# Patient Record
Sex: MALE | Race: WHITE | NOT HISPANIC OR LATINO
[De-identification: names, ages, dates, MRNs, and addresses within clinical notes are randomized per-mention and may not be internally consistent; named-entity substitution may affect disease eponyms.]

---

## 2018-01-03 ENCOUNTER — TRANSCRIPTION ENCOUNTER (OUTPATIENT)
Age: 63
End: 2018-01-03

## 2018-01-03 ENCOUNTER — INPATIENT (INPATIENT)
Facility: HOSPITAL | Age: 63
LOS: 0 days | Discharge: HOME | DRG: 694 | End: 2018-01-03
Attending: UROLOGY | Admitting: UROLOGY
Payer: COMMERCIAL

## 2018-01-03 VITALS
HEART RATE: 57 BPM | DIASTOLIC BLOOD PRESSURE: 79 MMHG | SYSTOLIC BLOOD PRESSURE: 152 MMHG | TEMPERATURE: 98 F | OXYGEN SATURATION: 99 %

## 2018-01-03 VITALS
OXYGEN SATURATION: 99 % | HEART RATE: 65 BPM | RESPIRATION RATE: 18 BRPM | SYSTOLIC BLOOD PRESSURE: 133 MMHG | DIASTOLIC BLOOD PRESSURE: 66 MMHG

## 2018-01-03 DIAGNOSIS — Z90.5 ACQUIRED ABSENCE OF KIDNEY: Chronic | ICD-10-CM

## 2018-01-03 DIAGNOSIS — N20.0 CALCULUS OF KIDNEY: ICD-10-CM

## 2018-01-03 LAB
ALBUMIN SERPL ELPH-MCNC: 4.5 G/DL — SIGNIFICANT CHANGE UP (ref 3.3–5)
ALP SERPL-CCNC: 59 U/L — SIGNIFICANT CHANGE UP (ref 40–120)
ALT FLD-CCNC: 19 U/L RC — SIGNIFICANT CHANGE UP (ref 10–45)
ANION GAP SERPL CALC-SCNC: 14 MMOL/L — SIGNIFICANT CHANGE UP (ref 5–17)
AST SERPL-CCNC: 20 U/L — SIGNIFICANT CHANGE UP (ref 10–40)
BASOPHILS # BLD AUTO: 0 K/UL — SIGNIFICANT CHANGE UP (ref 0–0.2)
BASOPHILS NFR BLD AUTO: 0.2 % — SIGNIFICANT CHANGE UP (ref 0–2)
BILIRUB SERPL-MCNC: 1.8 MG/DL — HIGH (ref 0.2–1.2)
BUN SERPL-MCNC: 19 MG/DL — SIGNIFICANT CHANGE UP (ref 7–23)
CALCIUM SERPL-MCNC: 10.6 MG/DL — HIGH (ref 8.4–10.5)
CHLORIDE SERPL-SCNC: 102 MMOL/L — SIGNIFICANT CHANGE UP (ref 96–108)
CO2 SERPL-SCNC: 26 MMOL/L — SIGNIFICANT CHANGE UP (ref 22–31)
CREAT SERPL-MCNC: 1.95 MG/DL — HIGH (ref 0.5–1.3)
EOSINOPHIL # BLD AUTO: 0 K/UL — SIGNIFICANT CHANGE UP (ref 0–0.5)
EOSINOPHIL NFR BLD AUTO: 0 % — SIGNIFICANT CHANGE UP (ref 0–6)
GLUCOSE SERPL-MCNC: 141 MG/DL — HIGH (ref 70–99)
HCT VFR BLD CALC: 43.2 % — SIGNIFICANT CHANGE UP (ref 39–50)
HGB BLD-MCNC: 14.9 G/DL — SIGNIFICANT CHANGE UP (ref 13–17)
LYMPHOCYTES # BLD AUTO: 0.8 K/UL — LOW (ref 1–3.3)
LYMPHOCYTES # BLD AUTO: 5.2 % — LOW (ref 13–44)
MCHC RBC-ENTMCNC: 31.1 PG — SIGNIFICANT CHANGE UP (ref 27–34)
MCHC RBC-ENTMCNC: 34.5 GM/DL — SIGNIFICANT CHANGE UP (ref 32–36)
MCV RBC AUTO: 90.2 FL — SIGNIFICANT CHANGE UP (ref 80–100)
MONOCYTES # BLD AUTO: 0.8 K/UL — SIGNIFICANT CHANGE UP (ref 0–0.9)
MONOCYTES NFR BLD AUTO: 5.1 % — SIGNIFICANT CHANGE UP (ref 2–14)
NEUTROPHILS # BLD AUTO: 14.2 K/UL — HIGH (ref 1.8–7.4)
NEUTROPHILS NFR BLD AUTO: 89.5 % — HIGH (ref 43–77)
PLATELET # BLD AUTO: 213 K/UL — SIGNIFICANT CHANGE UP (ref 150–400)
POTASSIUM SERPL-MCNC: 4.9 MMOL/L — SIGNIFICANT CHANGE UP (ref 3.5–5.3)
POTASSIUM SERPL-SCNC: 4.9 MMOL/L — SIGNIFICANT CHANGE UP (ref 3.5–5.3)
PROT SERPL-MCNC: 7.5 G/DL — SIGNIFICANT CHANGE UP (ref 6–8.3)
RBC # BLD: 4.79 M/UL — SIGNIFICANT CHANGE UP (ref 4.2–5.8)
RBC # FLD: 11.5 % — SIGNIFICANT CHANGE UP (ref 10.3–14.5)
SODIUM SERPL-SCNC: 142 MMOL/L — SIGNIFICANT CHANGE UP (ref 135–145)
WBC # BLD: 15.8 K/UL — HIGH (ref 3.8–10.5)
WBC # FLD AUTO: 15.8 K/UL — HIGH (ref 3.8–10.5)

## 2018-01-03 PROCEDURE — 93010 ELECTROCARDIOGRAM REPORT: CPT

## 2018-01-03 PROCEDURE — 74420 UROGRAPHY RTRGR +-KUB: CPT | Mod: 26

## 2018-01-03 PROCEDURE — 74176 CT ABD & PELVIS W/O CONTRAST: CPT | Mod: 26

## 2018-01-03 PROCEDURE — 85027 COMPLETE CBC AUTOMATED: CPT

## 2018-01-03 PROCEDURE — 76000 FLUOROSCOPY <1 HR PHYS/QHP: CPT

## 2018-01-03 PROCEDURE — 52332 CYSTOSCOPY AND TREATMENT: CPT | Mod: LT

## 2018-01-03 PROCEDURE — C1758: CPT

## 2018-01-03 PROCEDURE — 99285 EMERGENCY DEPT VISIT HI MDM: CPT | Mod: 25

## 2018-01-03 PROCEDURE — 74176 CT ABD & PELVIS W/O CONTRAST: CPT

## 2018-01-03 PROCEDURE — C1769: CPT

## 2018-01-03 PROCEDURE — 96374 THER/PROPH/DIAG INJ IV PUSH: CPT

## 2018-01-03 PROCEDURE — 93005 ELECTROCARDIOGRAM TRACING: CPT

## 2018-01-03 PROCEDURE — 80053 COMPREHEN METABOLIC PANEL: CPT

## 2018-01-03 PROCEDURE — C2617: CPT

## 2018-01-03 RX ORDER — CEFTRIAXONE 500 MG/1
1 INJECTION, POWDER, FOR SOLUTION INTRAMUSCULAR; INTRAVENOUS ONCE
Qty: 0 | Refills: 0 | Status: COMPLETED | OUTPATIENT
Start: 2018-01-03 | End: 2018-01-03

## 2018-01-03 RX ORDER — SODIUM CHLORIDE 9 MG/ML
1000 INJECTION, SOLUTION INTRAVENOUS
Qty: 0 | Refills: 0 | Status: DISCONTINUED | OUTPATIENT
Start: 2018-01-03 | End: 2018-01-03

## 2018-01-03 RX ORDER — ONDANSETRON 8 MG/1
4 TABLET, FILM COATED ORAL ONCE
Qty: 0 | Refills: 0 | Status: COMPLETED | OUTPATIENT
Start: 2018-01-03 | End: 2018-01-03

## 2018-01-03 RX ORDER — SODIUM CHLORIDE 9 MG/ML
1000 INJECTION INTRAMUSCULAR; INTRAVENOUS; SUBCUTANEOUS ONCE
Qty: 0 | Refills: 0 | Status: COMPLETED | OUTPATIENT
Start: 2018-01-03 | End: 2018-01-03

## 2018-01-03 RX ORDER — SODIUM CHLORIDE 9 MG/ML
1000 INJECTION INTRAMUSCULAR; INTRAVENOUS; SUBCUTANEOUS
Qty: 0 | Refills: 0 | Status: DISCONTINUED | OUTPATIENT
Start: 2018-01-03 | End: 2018-01-03

## 2018-01-03 RX ORDER — HYDROMORPHONE HYDROCHLORIDE 2 MG/ML
1 INJECTION INTRAMUSCULAR; INTRAVENOUS; SUBCUTANEOUS ONCE
Qty: 0 | Refills: 0 | Status: DISCONTINUED | OUTPATIENT
Start: 2018-01-03 | End: 2018-01-03

## 2018-01-03 RX ORDER — HYDROMORPHONE HYDROCHLORIDE 2 MG/ML
1 INJECTION INTRAMUSCULAR; INTRAVENOUS; SUBCUTANEOUS
Qty: 0 | Refills: 0 | Status: DISCONTINUED | OUTPATIENT
Start: 2018-01-03 | End: 2018-01-03

## 2018-01-03 RX ORDER — HYDROMORPHONE HYDROCHLORIDE 2 MG/ML
0.25 INJECTION INTRAMUSCULAR; INTRAVENOUS; SUBCUTANEOUS
Qty: 0 | Refills: 0 | Status: DISCONTINUED | OUTPATIENT
Start: 2018-01-03 | End: 2018-01-03

## 2018-01-03 RX ORDER — TAMSULOSIN HYDROCHLORIDE 0.4 MG/1
1 CAPSULE ORAL
Qty: 14 | Refills: 0 | OUTPATIENT
Start: 2018-01-03 | End: 2018-01-16

## 2018-01-03 RX ORDER — MORPHINE SULFATE 50 MG/1
4 CAPSULE, EXTENDED RELEASE ORAL ONCE
Qty: 0 | Refills: 0 | Status: DISCONTINUED | OUTPATIENT
Start: 2018-01-03 | End: 2018-01-03

## 2018-01-03 RX ORDER — ONDANSETRON 8 MG/1
4 TABLET, FILM COATED ORAL ONCE
Qty: 0 | Refills: 0 | Status: DISCONTINUED | OUTPATIENT
Start: 2018-01-03 | End: 2018-01-03

## 2018-01-03 RX ORDER — MORPHINE SULFATE 50 MG/1
6 CAPSULE, EXTENDED RELEASE ORAL ONCE
Qty: 0 | Refills: 0 | Status: DISCONTINUED | OUTPATIENT
Start: 2018-01-03 | End: 2018-01-03

## 2018-01-03 RX ORDER — CEPHALEXIN 500 MG
1 CAPSULE ORAL
Qty: 6 | Refills: 0 | OUTPATIENT
Start: 2018-01-03 | End: 2018-01-05

## 2018-01-03 RX ADMIN — SODIUM CHLORIDE 125 MILLILITER(S): 9 INJECTION, SOLUTION INTRAVENOUS at 13:03

## 2018-01-03 RX ADMIN — SODIUM CHLORIDE 1000 MILLILITER(S): 9 INJECTION INTRAMUSCULAR; INTRAVENOUS; SUBCUTANEOUS at 08:15

## 2018-01-03 RX ADMIN — SODIUM CHLORIDE 1000 MILLILITER(S): 9 INJECTION INTRAMUSCULAR; INTRAVENOUS; SUBCUTANEOUS at 10:28

## 2018-01-03 RX ADMIN — MORPHINE SULFATE 6 MILLIGRAM(S): 50 CAPSULE, EXTENDED RELEASE ORAL at 08:15

## 2018-01-03 RX ADMIN — CEFTRIAXONE 100 GRAM(S): 500 INJECTION, POWDER, FOR SOLUTION INTRAMUSCULAR; INTRAVENOUS at 12:57

## 2018-01-03 RX ADMIN — ONDANSETRON 4 MILLIGRAM(S): 8 TABLET, FILM COATED ORAL at 12:45

## 2018-01-03 RX ADMIN — MORPHINE SULFATE 6 MILLIGRAM(S): 50 CAPSULE, EXTENDED RELEASE ORAL at 09:31

## 2018-01-03 RX ADMIN — HYDROMORPHONE HYDROCHLORIDE 1 MILLIGRAM(S): 2 INJECTION INTRAMUSCULAR; INTRAVENOUS; SUBCUTANEOUS at 09:31

## 2018-01-03 RX ADMIN — HYDROMORPHONE HYDROCHLORIDE 1 MILLIGRAM(S): 2 INJECTION INTRAMUSCULAR; INTRAVENOUS; SUBCUTANEOUS at 08:40

## 2018-01-03 NOTE — H&P ADULT - NSHPLABSRESULTS_GEN_ALL_CORE
14.9   15.8  )-----------( 213      ( 03 Jan 2018 08:16 )             43.2   01-03    142  |  102  |  19  ----------------------------<  141<H>  4.9   |  26  |  1.95<H>    Ca    10.6<H>      03 Jan 2018 08:16    TPro  7.5  /  Alb  4.5  /  TBili  1.8<H>  /  DBili  x   /  AST  20  /  ALT  19  /  AlkPhos  59  01-03    UA pending    < from: CT Abdomen and Pelvis No Cont (01.03.18 @ 09:10) >      Severe left-sided hydronephrosis with an obstructive 1.1 cm calculus at   the proximal ureter. Additional punctate nonobstructive left renal   calculi.      < end of copied text >

## 2018-01-03 NOTE — DISCHARGE NOTE ADULT - HOSPITAL COURSE
62 year old male with history of solitary L kidney  ( R nephrectomy 20 years ago), c/o left flank pain since last night.  He also c/o nausea and vomiting, but no fevers or chills. No dysuria or hematuria, but unable to void since 4am.  He has no history of kidney stones. Currently pain controlled. Creat in ER is 1.9, baseline 1.0.      He was taken to the OR for a L stent. Postop he was stable and was sent home and will follow up on Friday for recheck of Creat 62 year old male with history of solitary L kidney  ( R nephrectomy 20 years ago), c/o left flank pain since last night.  He also c/o nausea and vomiting, but no fevers or chills. No dysuria or hematuria, but unable to void since 4am.  He has no history of kidney stones. Currently pain controlled. Creat in ER is 1.9, baseline 1.0.      He was taken to the OR for a L stent. Postop he was stable and was sent home and will follow up on Friday for recheck of Creatinine. Patient will follow up next week for evaluation and stone planning.

## 2018-01-03 NOTE — H&P ADULT - HISTORY OF PRESENT ILLNESS
62 year old male with history of solitary L kidney  ( R nephrectomy 20 years ago), c/o left flank pain since last night.  He also c/o nausea and vomiting, but no fevers or chills. No dysuria or hematuria, but unable to void since 4am.  He has no history of kidney stones. Currently pain controlled. Creat in ER is 1.9, baseline 1.0.

## 2018-01-03 NOTE — ED PROVIDER NOTE - PROGRESS NOTE DETAILS
Administered morphine for pain, fluids. R/o nephrolithiasis. Dr. Castano Note: pain improved after multiple doses of opiates.  Pt updated on ct results.  Given one kidney with elevated Cr and severe pain with large proximal stone, will consult urologist for likely stone removal.

## 2018-01-03 NOTE — ED PROVIDER NOTE - ATTENDING CONTRIBUTION TO CARE
Pt s/p resected kidney several years ago with normal renal function presents with acute flank pain preceded by vague flank pain 2 days prior, acute today, constant, severe.  L CVA td, severe distress.

## 2018-01-03 NOTE — ED PROVIDER NOTE - OBJECTIVE STATEMENT
61 yo m with history of right kidney removal (from UPJ obstruction) presents with chief complaint of left flank pain that began abruptly overnight. Describes pain as severe, nonradiating with associated NBNB vomiting x1. The patient denies taking medications for pain. Denies dysuria, hematuria, fever or chills.

## 2018-01-03 NOTE — ED ADULT NURSE NOTE - OBJECTIVE STATEMENT
Pt is a 63 yo M who came to the ED amb c/o left flank pain since 0300 today. Paion is sharp, non radiating, + chills, no fever, no hematuria, no pain/burning, no difficulty voiding.

## 2018-01-03 NOTE — DISCHARGE NOTE ADULT - PATIENT PORTAL LINK FT
“You can access the Follow[employer] Patient Portal, offered by [facility], by registering with the following website: [email or web address]”

## 2018-01-03 NOTE — DISCHARGE NOTE ADULT - PLAN OF CARE
If you are taking narcotic pain medication (such as Percocet), do NOT drive a car, operate machinery or make important decisions.  DIET: Return to your usual diet.  PAIN: You may take your pain prescription up to the amount and intervals prescribed. You may choose to space the medication to longer intervals, and to take over the counter pain medications such as Tylenol or Ibuprofen. Please do not exceed greater than 3500mg of Tylenol per day.  NOTIFY YOUR SURGEON IF: You have any fever (over 100.4 F) or chills, persistent nausea/vomiting, persistent diarrhea, or if your pain is not controlled on your discharge pain medications.  FOLLOW-UP:  1. Please arrive at clinic on Friday for blood work. Phone number for clinic is [phone]. Address for clinic is [address] [place] [address].  2. Call Camelia's office at the above number for appointment any day next week. Relief from pain and optimization of renal function

## 2018-01-03 NOTE — DISCHARGE NOTE ADULT - MEDICATION SUMMARY - MEDICATIONS TO TAKE
I will START or STAY ON the medications listed below when I get home from the hospital:    oxyCODONE-acetaminophen 5 mg-325 mg oral tablet  -- 1 tab(s) by mouth every 6 hours MDD:6  -- Caution federal law prohibits the transfer of this drug to any person other  than the person for whom it was prescribed.  May cause drowsiness.  Alcohol may intensify this effect.  Use care when operating dangerous machinery.  This prescription cannot be refilled.  This product contains acetaminophen.  Do not use  with any other product containing acetaminophen to prevent possible liver damage.  Using more of this medication than prescribed may cause serious breathing problems.    -- Indication: For As needed for pain    tamsulosin 0.4 mg oral capsule  -- 1 cap(s) by mouth once a day (at bedtime)   -- It is very important that you take or use this exactly as directed.  Do not skip doses or discontinue unless directed by your doctor.  May cause drowsiness.  Alcohol may intensify this effect.  Use care when operating dangerous machinery.  Some non-prescription drugs may aggravate your condition.  Read all labels carefully.  If a warning appears, check with your doctor before taking.  Swallow whole.  Do not crush.  Take with food or milk.    -- Indication: For Stone    Keflex 250 mg oral capsule  -- 1 cap(s) by mouth 2 times a day   -- Finish all this medication unless otherwise directed by prescriber.    -- Indication: For Antibiotics prophylaxis    omeprazole  -- Indication: For Home

## 2018-01-03 NOTE — H&P ADULT - ASSESSMENT
CECILY, renal colic due to 1cm stone in L solitary kidney  - Admit  - Hydration  - pain control  - NPO  - OR for emergent stent placement

## 2018-01-03 NOTE — ED ADULT NURSE REASSESSMENT NOTE - NS ED NURSE REASSESS COMMENT FT1
Pt declined jorge, states the urologist stated it wasn't necessary. Dr Castano advised. A/O x3, pain improved. transferred to the OR.

## 2018-01-03 NOTE — DISCHARGE NOTE ADULT - CARE PLAN
Principal Discharge DX:	Kidney stone  Goal:	Relief from pain and optimization of renal function  Instructions for follow-up, activity and diet:	If you are taking narcotic pain medication (such as Percocet), do NOT drive a car, operate machinery or make important decisions.  DIET: Return to your usual diet.  PAIN: You may take your pain prescription up to the amount and intervals prescribed. You may choose to space the medication to longer intervals, and to take over the counter pain medications such as Tylenol or Ibuprofen. Please do not exceed greater than 3500mg of Tylenol per day.  NOTIFY YOUR SURGEON IF: You have any fever (over 100.4 F) or chills, persistent nausea/vomiting, persistent diarrhea, or if your pain is not controlled on your discharge pain medications.  FOLLOW-UP:  1. Please arrive at clinic on Friday for blood work. Phone number for clinic is [phone]. Address for clinic is [address] [place] [address].  2. Call Camelia's office at the above number for appointment any day next week.

## 2018-01-04 ENCOUNTER — TRANSCRIPTION ENCOUNTER (OUTPATIENT)
Age: 63
End: 2018-01-04

## 2018-01-05 LAB
ANION GAP SERPL CALC-SCNC: 13 MMOL/L
BUN SERPL-MCNC: 19 MG/DL
CALCIUM SERPL-MCNC: 10.6 MG/DL
CHLORIDE SERPL-SCNC: 100 MMOL/L
CO2 SERPL-SCNC: 29 MMOL/L
CREAT SERPL-MCNC: 1.15 MG/DL
GLUCOSE SERPL-MCNC: 79 MG/DL
POTASSIUM SERPL-SCNC: 5.2 MMOL/L
SODIUM SERPL-SCNC: 142 MMOL/L

## 2018-01-09 ENCOUNTER — APPOINTMENT (OUTPATIENT)
Dept: UROLOGY | Facility: CLINIC | Age: 63
End: 2018-01-09
Payer: COMMERCIAL

## 2018-01-09 PROCEDURE — 99214 OFFICE O/P EST MOD 30 MIN: CPT

## 2018-01-10 LAB
CALCIUM SERPL-MCNC: 10.2 MG/DL
PARATHYROID HORMONE INTACT: 49 PG/ML

## 2018-01-11 LAB — BACTERIA UR CULT: NORMAL

## 2018-01-24 ENCOUNTER — OUTPATIENT (OUTPATIENT)
Dept: OUTPATIENT SERVICES | Facility: HOSPITAL | Age: 63
LOS: 1 days | End: 2018-01-24
Payer: COMMERCIAL

## 2018-01-24 VITALS
HEIGHT: 74 IN | SYSTOLIC BLOOD PRESSURE: 144 MMHG | OXYGEN SATURATION: 96 % | WEIGHT: 179.02 LBS | TEMPERATURE: 99 F | HEART RATE: 72 BPM | RESPIRATION RATE: 17 BRPM | DIASTOLIC BLOOD PRESSURE: 72 MMHG

## 2018-01-24 DIAGNOSIS — Z90.5 ACQUIRED ABSENCE OF KIDNEY: Chronic | ICD-10-CM

## 2018-01-24 DIAGNOSIS — N20.1 CALCULUS OF URETER: ICD-10-CM

## 2018-01-24 DIAGNOSIS — K21.9 GASTRO-ESOPHAGEAL REFLUX DISEASE WITHOUT ESOPHAGITIS: ICD-10-CM

## 2018-01-24 LAB
APPEARANCE UR: SIGNIFICANT CHANGE UP
BILIRUB UR-MCNC: NEGATIVE — SIGNIFICANT CHANGE UP
BLOOD UR QL VISUAL: HIGH
BUN SERPL-MCNC: 18 MG/DL — SIGNIFICANT CHANGE UP (ref 7–23)
CALCIUM SERPL-MCNC: 9.7 MG/DL — SIGNIFICANT CHANGE UP (ref 8.4–10.5)
CHLORIDE SERPL-SCNC: 104 MMOL/L — SIGNIFICANT CHANGE UP (ref 98–107)
CO2 SERPL-SCNC: 29 MMOL/L — SIGNIFICANT CHANGE UP (ref 22–31)
COLOR SPEC: YELLOW — SIGNIFICANT CHANGE UP
CREAT SERPL-MCNC: 1.07 MG/DL — SIGNIFICANT CHANGE UP (ref 0.5–1.3)
GLUCOSE SERPL-MCNC: 91 MG/DL — SIGNIFICANT CHANGE UP (ref 70–99)
GLUCOSE UR-MCNC: NEGATIVE — SIGNIFICANT CHANGE UP
HCT VFR BLD CALC: 44.7 % — SIGNIFICANT CHANGE UP (ref 39–50)
HGB BLD-MCNC: 14.2 G/DL — SIGNIFICANT CHANGE UP (ref 13–17)
HYALINE CASTS # UR AUTO: SIGNIFICANT CHANGE UP (ref 0–?)
KETONES UR-MCNC: NEGATIVE — SIGNIFICANT CHANGE UP
LEUKOCYTE ESTERASE UR-ACNC: HIGH
MCHC RBC-ENTMCNC: 28.3 PG — SIGNIFICANT CHANGE UP (ref 27–34)
MCHC RBC-ENTMCNC: 31.8 % — LOW (ref 32–36)
MCV RBC AUTO: 89.2 FL — SIGNIFICANT CHANGE UP (ref 80–100)
MUCOUS THREADS # UR AUTO: SIGNIFICANT CHANGE UP
NITRITE UR-MCNC: NEGATIVE — SIGNIFICANT CHANGE UP
NRBC # FLD: 0 — SIGNIFICANT CHANGE UP
PH UR: 6 — SIGNIFICANT CHANGE UP (ref 4.6–8)
PLATELET # BLD AUTO: 238 K/UL — SIGNIFICANT CHANGE UP (ref 150–400)
PMV BLD: 11 FL — SIGNIFICANT CHANGE UP (ref 7–13)
POTASSIUM SERPL-MCNC: 4.7 MMOL/L — SIGNIFICANT CHANGE UP (ref 3.5–5.3)
POTASSIUM SERPL-SCNC: 4.7 MMOL/L — SIGNIFICANT CHANGE UP (ref 3.5–5.3)
PROT UR-MCNC: 100 MG/DL — SIGNIFICANT CHANGE UP
RBC # BLD: 5.01 M/UL — SIGNIFICANT CHANGE UP (ref 4.2–5.8)
RBC # FLD: 13 % — SIGNIFICANT CHANGE UP (ref 10.3–14.5)
RBC CASTS # UR COMP ASSIST: >50 — HIGH (ref 0–?)
SODIUM SERPL-SCNC: 144 MMOL/L — SIGNIFICANT CHANGE UP (ref 135–145)
SP GR SPEC: 1.02 — SIGNIFICANT CHANGE UP (ref 1–1.04)
UROBILINOGEN FLD QL: NORMAL MG/DL — SIGNIFICANT CHANGE UP
WBC # BLD: 7.59 K/UL — SIGNIFICANT CHANGE UP (ref 3.8–10.5)
WBC # FLD AUTO: 7.59 K/UL — SIGNIFICANT CHANGE UP (ref 3.8–10.5)
WBC UR QL: HIGH (ref 0–?)

## 2018-01-24 PROCEDURE — 93010 ELECTROCARDIOGRAM REPORT: CPT

## 2018-01-24 RX ORDER — SODIUM CHLORIDE 9 MG/ML
1000 INJECTION, SOLUTION INTRAVENOUS
Qty: 0 | Refills: 0 | Status: DISCONTINUED | OUTPATIENT
Start: 2018-01-29 | End: 2018-01-29

## 2018-01-24 NOTE — H&P PST ADULT - PROBLEM SELECTOR PLAN 1
Left Ureteroscopy, Laser Lithotripsy, Possible Balloon Dilation/Laser or Distal Stricture on 01/29/18.  Preop instructions given, pt verbalized understanding.  Chlorhexidine wash with instructions given  Pt to take own PPI for GI prophylaxis  Pt has appt for medical clearance. Clearance requested Left Ureteroscopy, Laser Lithotripsy, Possible Balloon Dilation/Laser or Distal Stricture on 01/29/18.  Preop instructions given, pt verbalized understanding.  Chlorhexidine wash with instructions given  Pt to take own PPI for GI prophylaxis  Pt has appt for medical clearance. Requested copy

## 2018-01-24 NOTE — H&P PST ADULT - FAMILY HISTORY
Mother  Still living? Unknown  Family history of carcinoid tumor, Age at diagnosis: Age Unknown     Grandparent  Still living? Unknown  Family history of pancreatic cancer, Age at diagnosis: Age Unknown

## 2018-01-24 NOTE — H&P PST ADULT - NEGATIVE GASTROINTESTINAL SYMPTOMS
no constipation/no vomiting/no diarrhea/no nausea no abdominal pain/no diarrhea/no nausea/no vomiting/no constipation

## 2018-01-24 NOTE — H&P PST ADULT - NSANTHOSAYNRD_GEN_A_CORE
No. ANDREW screening performed.  STOP BANG Legend: 0-2 = LOW Risk; 3-4 = INTERMEDIATE Risk; 5-8 = HIGH Risk

## 2018-01-24 NOTE — H&P PST ADULT - PMH
Calculus of ureter    GERD (gastroesophageal reflux disease)    UPJ obstruction, congenital Barretts esophagus  reports s/p radiofrequency ablation in 2012  Calculus of ureter    GERD (gastroesophageal reflux disease)    UPJ obstruction, congenital

## 2018-01-24 NOTE — H&P PST ADULT - PSH
History of nephrectomy  right, due to congenital defect History of nephrectomy  right, due to congenital defect ~1998

## 2018-01-24 NOTE — H&P PST ADULT - GASTROINTESTINAL COMMENTS
hx of barretts esophagus, last EGD 2015 hx of barretts esophagus, treated, last EGD 2015 normal as per patient

## 2018-01-24 NOTE — H&P PST ADULT - HISTORY OF PRESENT ILLNESS
62 y.o. male with hx of congenital UPJ, s/p right nephrectomy in 1998, reports c/o left sided flank pain since 01/02/18, denies hematuria, dysuria, s/p ER visit, s/p CT scan, diagnosed with left renal stone, s/p stent placement. Pt presents to PST for evaluation for Left Ureteroscopy, Laser Lithotripsy, Possible Balloon Dilation/Laser or Distal Stricture on 01/29/18. 62 y.o. male with hx of congenital UPJ, s/p right nephrectomy in 1998, reports c/o sudden onset of severe left sided flank pain since 01/02/18,  s/p ER visit, s/p CT scan, diagnosed with left renal stone, reports ureteral stent placement was done, denies hematuria, dysuria. Pt presents to [facility] for evaluation for Left Ureteroscopy, Laser Lithotripsy, Possible Balloon Dilation/Laser or Distal Stricture on 01/29/18. 62 y.o. male with hx of congenital UPJ obstruction, s/p right nephrectomy in 1998, pt reports sudden onset of severe left sided flank pain since 01/02/18,  s/p ER visit, s/p CT scan, diagnosed with left renal stone, reports ureteral stent placement was done, denies hematuria, dysuria. Pt presents to PST for evaluation for Left Ureteroscopy, Laser Lithotripsy, Possible Balloon Dilation/Laser or Distal Stricture on 01/29/18.

## 2018-01-24 NOTE — H&P PST ADULT - MUSCULOSKELETAL
details… detailed exam no joint swelling/no joint erythema/normal strength/no calf tenderness/no joint warmth/ROM intact

## 2018-01-24 NOTE — H&P PST ADULT - NEGATIVE GENERAL GENITOURINARY SYMPTOMS
no hematuria/no bladder infections/no dysuria no hematuria/no dysuria/no urine discoloration/no bladder infections

## 2018-01-26 LAB
BACTERIA UR CULT: SIGNIFICANT CHANGE UP
SPECIMEN SOURCE: SIGNIFICANT CHANGE UP

## 2018-01-26 NOTE — ASU PATIENT PROFILE, ADULT - PMH
Barretts esophagus  reports s/p radiofrequency ablation in 2012  Calculus of ureter    GERD (gastroesophageal reflux disease)    UPJ obstruction, congenital

## 2018-01-29 ENCOUNTER — OUTPATIENT (OUTPATIENT)
Dept: INPATIENT UNIT | Facility: HOSPITAL | Age: 63
LOS: 1 days | Discharge: HOME | End: 2018-01-29

## 2018-01-29 ENCOUNTER — APPOINTMENT (OUTPATIENT)
Dept: UROLOGY | Facility: HOSPITAL | Age: 63
End: 2018-01-29

## 2018-01-29 VITALS
RESPIRATION RATE: 15 BRPM | HEART RATE: 69 BPM | OXYGEN SATURATION: 94 % | DIASTOLIC BLOOD PRESSURE: 54 MMHG | SYSTOLIC BLOOD PRESSURE: 103 MMHG

## 2018-01-29 VITALS
DIASTOLIC BLOOD PRESSURE: 78 MMHG | WEIGHT: 175.05 LBS | HEIGHT: 74 IN | OXYGEN SATURATION: 96 % | RESPIRATION RATE: 16 BRPM | TEMPERATURE: 98 F | SYSTOLIC BLOOD PRESSURE: 129 MMHG | HEART RATE: 66 BPM

## 2018-01-29 DIAGNOSIS — Z90.5 ACQUIRED ABSENCE OF KIDNEY: Chronic | ICD-10-CM

## 2018-01-29 DIAGNOSIS — N20.1 CALCULUS OF URETER: ICD-10-CM

## 2018-01-29 RX ORDER — ONDANSETRON 8 MG/1
4 TABLET, FILM COATED ORAL ONCE
Qty: 0 | Refills: 0 | Status: DISCONTINUED | OUTPATIENT
Start: 2018-01-29 | End: 2018-01-29

## 2018-01-29 RX ORDER — HYDROMORPHONE HYDROCHLORIDE 2 MG/ML
0.5 INJECTION INTRAMUSCULAR; INTRAVENOUS; SUBCUTANEOUS
Qty: 0 | Refills: 0 | Status: DISCONTINUED | OUTPATIENT
Start: 2018-01-29 | End: 2018-01-29

## 2018-01-29 RX ORDER — AZTREONAM 2 G
1 VIAL (EA) INJECTION
Qty: 6 | Refills: 0 | OUTPATIENT
Start: 2018-01-29 | End: 2018-01-31

## 2018-01-29 RX ORDER — SODIUM CHLORIDE 9 MG/ML
1000 INJECTION, SOLUTION INTRAVENOUS
Qty: 0 | Refills: 0 | Status: DISCONTINUED | OUTPATIENT
Start: 2018-01-29 | End: 2018-01-29

## 2018-01-29 RX ORDER — TAMSULOSIN HYDROCHLORIDE 0.4 MG/1
1 CAPSULE ORAL
Qty: 30 | Refills: 0 | OUTPATIENT
Start: 2018-01-29 | End: 2018-02-27

## 2018-01-29 RX ADMIN — SODIUM CHLORIDE 75 MILLILITER(S): 9 INJECTION, SOLUTION INTRAVENOUS at 17:33

## 2018-01-29 RX ADMIN — SODIUM CHLORIDE 30 MILLILITER(S): 9 INJECTION, SOLUTION INTRAVENOUS at 17:17

## 2018-01-29 NOTE — ASU DISCHARGE PLAN (ADULT/PEDIATRIC). - INSTRUCTIONS
Please call your surgeon's office to schedule follow up in 7-10 days for office cystoscopy and ureteral stent removal. No change in diet. Drink plenty of fluids.

## 2018-01-29 NOTE — BRIEF OPERATIVE NOTE - PROCEDURE
<<-----Click on this checkbox to enter Procedure Ureteroscopy of left ureter with lithotripsy  01/29/2018    Active  [id number]

## 2018-01-29 NOTE — ASU DISCHARGE PLAN (ADULT/PEDIATRIC). - MEDICATION SUMMARY - MEDICATIONS TO TAKE
I will START or STAY ON the medications listed below when I get home from the hospital:    acetaminophen 325 mg oral tablet, disintegrating  -- 2 tab(s) by mouth every 4 hours, As Needed  -- Indication: For moderate to severe pain, as needed, over the counter    tamsulosin 0.4 mg oral capsule  -- 1 cap(s) by mouth once a day MDD:1 tab  -- It is very important that you take or use this exactly as directed.  Do not skip doses or discontinue unless directed by your doctor.  May cause drowsiness.  Alcohol may intensify this effect.  Use care when operating dangerous machinery.  Some non-prescription drugs may aggravate your condition.  Read all labels carefully.  If a warning appears, check with your doctor before taking.  Swallow whole.  Do not crush.  Take with food or milk.    -- Indication: For for stent discomfort, please take until stent removed    omeprazole 40 mg oral delayed release capsule  -- 1 cap(s) by mouth once a day  -- Indication: For home med    Bactrim  mg-160 mg oral tablet  -- 1 tab(s) by mouth 2 times a day MDD:2 tabs  -- Avoid prolonged or excessive exposure to direct and/or artificial sunlight while taking this medication.  Finish all this medication unless otherwise directed by prescriber.  Medication should be taken with plenty of water.    -- Indication: For antibiotic, please complete entire 3 day course

## 2018-01-30 ENCOUNTER — TRANSCRIPTION ENCOUNTER (OUTPATIENT)
Age: 63
End: 2018-01-30

## 2018-02-02 ENCOUNTER — RESULT CHARGE (OUTPATIENT)
Age: 63
End: 2018-02-02

## 2018-02-02 ENCOUNTER — APPOINTMENT (OUTPATIENT)
Dept: UROLOGY | Facility: CLINIC | Age: 63
End: 2018-02-02
Payer: COMMERCIAL

## 2018-02-02 VITALS
DIASTOLIC BLOOD PRESSURE: 75 MMHG | SYSTOLIC BLOOD PRESSURE: 147 MMHG | TEMPERATURE: 97.3 F | RESPIRATION RATE: 16 BRPM | WEIGHT: 175 LBS | BODY MASS INDEX: 21.76 KG/M2 | HEART RATE: 82 BPM | HEIGHT: 75 IN | OXYGEN SATURATION: 97 %

## 2018-02-02 VITALS — TEMPERATURE: 98.4 F

## 2018-02-02 LAB
BILIRUB UR QL STRIP: NORMAL
CLARITY UR: NORMAL
COLLECTION METHOD: NORMAL
GLUCOSE UR-MCNC: NORMAL
HCG UR QL: 0.2 EU/DL
HGB UR QL STRIP.AUTO: NORMAL
KETONES UR-MCNC: NORMAL
LEUKOCYTE ESTERASE UR QL STRIP: NORMAL
NITRITE UR QL STRIP: NORMAL
PH UR STRIP: 6
PROT UR STRIP-MCNC: NORMAL
SP GR UR STRIP: 1.01
STONE ANALYSIS-IMP: SIGNIFICANT CHANGE UP

## 2018-02-02 PROCEDURE — 99214 OFFICE O/P EST MOD 30 MIN: CPT

## 2018-02-02 PROCEDURE — 51798 US URINE CAPACITY MEASURE: CPT

## 2018-02-02 PROCEDURE — 81003 URINALYSIS AUTO W/O SCOPE: CPT | Mod: QW

## 2018-02-03 ENCOUNTER — OUTPATIENT (OUTPATIENT)
Dept: OUTPATIENT SERVICES | Facility: HOSPITAL | Age: 63
LOS: 1 days | End: 2018-02-03
Payer: COMMERCIAL

## 2018-02-03 ENCOUNTER — APPOINTMENT (OUTPATIENT)
Dept: ULTRASOUND IMAGING | Facility: CLINIC | Age: 63
End: 2018-02-03
Payer: COMMERCIAL

## 2018-02-03 ENCOUNTER — APPOINTMENT (OUTPATIENT)
Dept: RADIOLOGY | Facility: CLINIC | Age: 63
End: 2018-02-03
Payer: COMMERCIAL

## 2018-02-03 DIAGNOSIS — Z90.5 ACQUIRED ABSENCE OF KIDNEY: Chronic | ICD-10-CM

## 2018-02-03 DIAGNOSIS — Z00.8 ENCOUNTER FOR OTHER GENERAL EXAMINATION: ICD-10-CM

## 2018-02-03 LAB
ANION GAP SERPL CALC-SCNC: 17 MMOL/L
APPEARANCE: CLEAR
BACTERIA: NEGATIVE
BASOPHILS # BLD AUTO: 0.04 K/UL
BASOPHILS NFR BLD AUTO: 0.5 %
BILIRUBIN URINE: NEGATIVE
BLOOD URINE: ABNORMAL
BUN SERPL-MCNC: 21 MG/DL
CALCIUM SERPL-MCNC: 10.4 MG/DL
CHLORIDE SERPL-SCNC: 99 MMOL/L
CO2 SERPL-SCNC: 22 MMOL/L
COLOR: YELLOW
CREAT SERPL-MCNC: 1.44 MG/DL
EOSINOPHIL # BLD AUTO: 0.28 K/UL
EOSINOPHIL NFR BLD AUTO: 3.5 %
GLUCOSE QUALITATIVE U: NEGATIVE MG/DL
GLUCOSE SERPL-MCNC: 99 MG/DL
HCT VFR BLD CALC: 45.5 %
HGB BLD-MCNC: 15.4 G/DL
HYALINE CASTS: 1 /LPF
IMM GRANULOCYTES NFR BLD AUTO: 0.1 %
KETONES URINE: NEGATIVE
LEUKOCYTE ESTERASE URINE: ABNORMAL
LYMPHOCYTES # BLD AUTO: 2.63 K/UL
LYMPHOCYTES NFR BLD AUTO: 33.2 %
MAN DIFF?: NORMAL
MCHC RBC-ENTMCNC: 29.3 PG
MCHC RBC-ENTMCNC: 33.8 GM/DL
MCV RBC AUTO: 86.7 FL
MICROSCOPIC-UA: NORMAL
MONOCYTES # BLD AUTO: 0.77 K/UL
MONOCYTES NFR BLD AUTO: 9.7 %
NEUTROPHILS # BLD AUTO: 4.18 K/UL
NEUTROPHILS NFR BLD AUTO: 53 %
NITRITE URINE: NEGATIVE
PH URINE: 5.5
PLATELET # BLD AUTO: 224 K/UL
POTASSIUM SERPL-SCNC: 5.1 MMOL/L
PROTEIN URINE: NEGATIVE MG/DL
RBC # BLD: 5.25 M/UL
RBC # FLD: 13.3 %
RED BLOOD CELLS URINE: 32 /HPF
SODIUM SERPL-SCNC: 138 MMOL/L
SPECIFIC GRAVITY URINE: 1.01
SQUAMOUS EPITHELIAL CELLS: 1 /HPF
UROBILINOGEN URINE: NEGATIVE MG/DL
WBC # FLD AUTO: 7.91 K/UL
WHITE BLOOD CELLS URINE: 4 /HPF

## 2018-02-03 PROCEDURE — 76775 US EXAM ABDO BACK WALL LIM: CPT

## 2018-02-03 PROCEDURE — 74018 RADEX ABDOMEN 1 VIEW: CPT

## 2018-02-03 PROCEDURE — 76775 US EXAM ABDO BACK WALL LIM: CPT | Mod: 26

## 2018-02-03 PROCEDURE — 74018 RADEX ABDOMEN 1 VIEW: CPT | Mod: 26

## 2018-02-05 ENCOUNTER — FORM ENCOUNTER (OUTPATIENT)
Age: 63
End: 2018-02-05

## 2018-02-05 LAB — BACTERIA UR CULT: NORMAL

## 2018-02-06 ENCOUNTER — APPOINTMENT (OUTPATIENT)
Dept: UROLOGY | Facility: CLINIC | Age: 63
End: 2018-02-06

## 2018-02-06 ENCOUNTER — APPOINTMENT (OUTPATIENT)
Dept: UROLOGY | Facility: CLINIC | Age: 63
End: 2018-02-06
Payer: COMMERCIAL

## 2018-02-06 ENCOUNTER — OUTPATIENT (OUTPATIENT)
Dept: OUTPATIENT SERVICES | Facility: HOSPITAL | Age: 63
LOS: 1 days | End: 2018-02-06
Payer: COMMERCIAL

## 2018-02-06 ENCOUNTER — APPOINTMENT (OUTPATIENT)
Dept: CT IMAGING | Facility: IMAGING CENTER | Age: 63
End: 2018-02-06

## 2018-02-06 DIAGNOSIS — N20.0 CALCULUS OF KIDNEY: ICD-10-CM

## 2018-02-06 DIAGNOSIS — Z90.5 ACQUIRED ABSENCE OF KIDNEY: Chronic | ICD-10-CM

## 2018-02-06 LAB
25(OH)D3 SERPL-MCNC: 39.5 NG/ML
TSH SERPL-ACNC: 3.2 UIU/ML

## 2018-02-06 PROCEDURE — 99213 OFFICE O/P EST LOW 20 MIN: CPT

## 2018-02-06 PROCEDURE — 74176 CT ABD & PELVIS W/O CONTRAST: CPT | Mod: 26

## 2018-02-06 PROCEDURE — 74176 CT ABD & PELVIS W/O CONTRAST: CPT

## 2018-02-07 ENCOUNTER — RECORD ABSTRACTING (OUTPATIENT)
Age: 63
End: 2018-02-07

## 2018-02-07 LAB
ANION GAP SERPL CALC-SCNC: 12 MMOL/L
BUN SERPL-MCNC: 23 MG/DL
CALCIUM SERPL-MCNC: 10.5 MG/DL
CHLORIDE SERPL-SCNC: 100 MMOL/L
CO2 SERPL-SCNC: 28 MMOL/L
CREAT SERPL-MCNC: 1.2 MG/DL
GLUCOSE SERPL-MCNC: 78 MG/DL
POTASSIUM SERPL-SCNC: 5.2 MMOL/L
SODIUM SERPL-SCNC: 140 MMOL/L

## 2018-03-08 ENCOUNTER — OUTPATIENT (OUTPATIENT)
Dept: OUTPATIENT SERVICES | Facility: HOSPITAL | Age: 63
LOS: 1 days | End: 2018-03-08

## 2018-03-08 VITALS
RESPIRATION RATE: 20 BRPM | WEIGHT: 175.05 LBS | TEMPERATURE: 99 F | DIASTOLIC BLOOD PRESSURE: 80 MMHG | SYSTOLIC BLOOD PRESSURE: 120 MMHG | HEIGHT: 74 IN | OXYGEN SATURATION: 97 % | HEART RATE: 73 BPM

## 2018-03-08 DIAGNOSIS — N20.0 CALCULUS OF KIDNEY: ICD-10-CM

## 2018-03-08 DIAGNOSIS — Z90.5 ACQUIRED ABSENCE OF KIDNEY: Chronic | ICD-10-CM

## 2018-03-08 DIAGNOSIS — Z98.890 OTHER SPECIFIED POSTPROCEDURAL STATES: Chronic | ICD-10-CM

## 2018-03-08 DIAGNOSIS — K21.9 GASTRO-ESOPHAGEAL REFLUX DISEASE WITHOUT ESOPHAGITIS: ICD-10-CM

## 2018-03-08 LAB
APPEARANCE UR: CLEAR — SIGNIFICANT CHANGE UP
BILIRUB UR-MCNC: NEGATIVE — SIGNIFICANT CHANGE UP
BLOOD UR QL VISUAL: HIGH
BUN SERPL-MCNC: 23 MG/DL — SIGNIFICANT CHANGE UP (ref 7–23)
CALCIUM SERPL-MCNC: 9.6 MG/DL — SIGNIFICANT CHANGE UP (ref 8.4–10.5)
CHLORIDE SERPL-SCNC: 102 MMOL/L — SIGNIFICANT CHANGE UP (ref 98–107)
CO2 SERPL-SCNC: 27 MMOL/L — SIGNIFICANT CHANGE UP (ref 22–31)
COLOR SPEC: YELLOW — SIGNIFICANT CHANGE UP
CREAT SERPL-MCNC: 1.13 MG/DL — SIGNIFICANT CHANGE UP (ref 0.5–1.3)
GLUCOSE SERPL-MCNC: 86 MG/DL — SIGNIFICANT CHANGE UP (ref 70–99)
GLUCOSE UR-MCNC: NEGATIVE — SIGNIFICANT CHANGE UP
HCT VFR BLD CALC: 44.3 % — SIGNIFICANT CHANGE UP (ref 39–50)
HGB BLD-MCNC: 14.8 G/DL — SIGNIFICANT CHANGE UP (ref 13–17)
KETONES UR-MCNC: NEGATIVE — SIGNIFICANT CHANGE UP
LEUKOCYTE ESTERASE UR-ACNC: HIGH
MCHC RBC-ENTMCNC: 30.1 PG — SIGNIFICANT CHANGE UP (ref 27–34)
MCHC RBC-ENTMCNC: 33.4 % — SIGNIFICANT CHANGE UP (ref 32–36)
MCV RBC AUTO: 90.2 FL — SIGNIFICANT CHANGE UP (ref 80–100)
MUCOUS THREADS # UR AUTO: SIGNIFICANT CHANGE UP
NITRITE UR-MCNC: NEGATIVE — SIGNIFICANT CHANGE UP
NON-SQ EPI CELLS # UR AUTO: <1 — SIGNIFICANT CHANGE UP
NRBC # FLD: 0 — SIGNIFICANT CHANGE UP
PH UR: 6 — SIGNIFICANT CHANGE UP (ref 4.6–8)
PLATELET # BLD AUTO: 230 K/UL — SIGNIFICANT CHANGE UP (ref 150–400)
PMV BLD: 10.8 FL — SIGNIFICANT CHANGE UP (ref 7–13)
POTASSIUM SERPL-MCNC: 4.5 MMOL/L — SIGNIFICANT CHANGE UP (ref 3.5–5.3)
POTASSIUM SERPL-SCNC: 4.5 MMOL/L — SIGNIFICANT CHANGE UP (ref 3.5–5.3)
PROT UR-MCNC: 100 MG/DL — SIGNIFICANT CHANGE UP
RBC # BLD: 4.91 M/UL — SIGNIFICANT CHANGE UP (ref 4.2–5.8)
RBC # FLD: 12.8 % — SIGNIFICANT CHANGE UP (ref 10.3–14.5)
RBC CASTS # UR COMP ASSIST: >50 — HIGH (ref 0–?)
SODIUM SERPL-SCNC: 141 MMOL/L — SIGNIFICANT CHANGE UP (ref 135–145)
SP GR SPEC: 1.02 — SIGNIFICANT CHANGE UP (ref 1–1.04)
UROBILINOGEN FLD QL: NORMAL MG/DL — SIGNIFICANT CHANGE UP
WBC # BLD: 6.43 K/UL — SIGNIFICANT CHANGE UP (ref 3.8–10.5)
WBC # FLD AUTO: 6.43 K/UL — SIGNIFICANT CHANGE UP (ref 3.8–10.5)
WBC UR QL: SIGNIFICANT CHANGE UP (ref 0–?)

## 2018-03-08 RX ORDER — SODIUM CHLORIDE 9 MG/ML
1000 INJECTION, SOLUTION INTRAVENOUS
Qty: 0 | Refills: 0 | Status: DISCONTINUED | OUTPATIENT
Start: 2018-03-21 | End: 2018-03-21

## 2018-03-08 NOTE — H&P PST ADULT - NEGATIVE NEUROLOGICAL SYMPTOMS
no focal seizures/no transient paralysis/no paresthesias/no syncope/no weakness/no generalized seizures

## 2018-03-08 NOTE — H&P PST ADULT - HISTORY OF PRESENT ILLNESS
Pt is a 62 y.o. male ;Pt with h/o Congenital   UPJ obstruction; pt s/p Right Nephrectomy 1998. Pt with h/o Left Renal Stone ;pt s/p Cystoscopy I Insertion of Left Ureteral Stent 1/02/18 .  Pt s/p Left Ureteroscopy , Laser Lithotripsy , Balloon Dilatation Pt is a 62 y.o. male ;Pt with h/o Congenital   UPJ obstruction; pt s/p Right Nephrectomy 1998. Pt with h/o Left Renal Stone ;pt s/p Cystoscopy I Insertion of Left Ureteral Stent 1/02/18 .  Pt s/p Left Ureteroscopy , Laser Lithotripsy , Balloon Dilatation 1/29/18. Pt f/u with surgeon ; pt now presents for Left Ureteroscopy. Second Look Laser Lithotripsy Pt is a 62 y.o. male ;Pt with h/o Congenital   UPJ obstruction; pt s/p Right Nephrectomy 1998. Pt with h/o Left Renal Stone ;pt s/p Cystoscopy ,  Insertion of Left Ureteral Stent 1/02/18 .  Pt s/p Left Ureteroscopy , Laser Lithotripsy , Balloon Dilatation 1/29/18. Pt f/u with surgeon ; pt now presents for Left Ureteroscopy. Second Look Laser Lithotripsy

## 2018-03-08 NOTE — H&P PST ADULT - PSH
H/O cystoscopy  Left Ureteral Stent Insertion 1/03/18  History of nephrectomy  right, due to congenital defect ~1998  S/P cystoscopy  Left Ureteroscopy , laser Lithotripsy,

## 2018-03-08 NOTE — H&P PST ADULT - PROBLEM SELECTOR PLAN 1
Left Ureteroscopy, Second Look with laser Lithotripsy    Pre op instructions reviewed with pt ; pt appeared to have a good understanding of pre op instructions

## 2018-03-08 NOTE — H&P PST ADULT - PMH
Barretts esophagus  reports s/p radiofrequency ablation in 2012 ; pt last endoscopy 2016  Calculus of ureter    GERD (gastroesophageal reflux disease)    Knee pain, right  Meniscus Tear right knee  UPJ obstruction, congenital  s/p Right Nephrectomy 1998

## 2018-03-08 NOTE — H&P PST ADULT - FAMILY HISTORY
Family history of carcinoid tumor     Grandparent  Still living? Unknown  Family history of pancreatic cancer, Age at diagnosis: Age Unknown

## 2018-03-08 NOTE — H&P PST ADULT - REASON FOR ADMISSION
"He is taking another look at my left kidney" "There are still pieces of a stone left in my left kidney" " "There are still pieces of a stone remain  in my left kidney" " "There are still pieces of a stone   in my left kidney" "

## 2018-03-10 LAB
BACTERIA UR CULT: SIGNIFICANT CHANGE UP
SPECIMEN SOURCE: SIGNIFICANT CHANGE UP

## 2018-03-21 ENCOUNTER — APPOINTMENT (OUTPATIENT)
Dept: UROLOGY | Facility: HOSPITAL | Age: 63
End: 2018-03-21

## 2018-03-21 ENCOUNTER — TRANSCRIPTION ENCOUNTER (OUTPATIENT)
Age: 63
End: 2018-03-21

## 2018-03-21 ENCOUNTER — OUTPATIENT (OUTPATIENT)
Dept: OUTPATIENT SERVICES | Facility: HOSPITAL | Age: 63
LOS: 1 days | Discharge: HOME | End: 2018-03-21
Payer: COMMERCIAL

## 2018-03-21 VITALS
HEART RATE: 57 BPM | DIASTOLIC BLOOD PRESSURE: 75 MMHG | OXYGEN SATURATION: 97 % | RESPIRATION RATE: 17 BRPM | SYSTOLIC BLOOD PRESSURE: 135 MMHG

## 2018-03-21 VITALS
DIASTOLIC BLOOD PRESSURE: 77 MMHG | WEIGHT: 175.05 LBS | SYSTOLIC BLOOD PRESSURE: 128 MMHG | OXYGEN SATURATION: 97 % | HEART RATE: 61 BPM | HEIGHT: 74 IN | RESPIRATION RATE: 16 BRPM | TEMPERATURE: 98 F

## 2018-03-21 DIAGNOSIS — N20.0 CALCULUS OF KIDNEY: ICD-10-CM

## 2018-03-21 DIAGNOSIS — Z98.890 OTHER SPECIFIED POSTPROCEDURAL STATES: Chronic | ICD-10-CM

## 2018-03-21 DIAGNOSIS — Z90.5 ACQUIRED ABSENCE OF KIDNEY: Chronic | ICD-10-CM

## 2018-03-21 PROCEDURE — 52332 CYSTOSCOPY AND TREATMENT: CPT | Mod: LT

## 2018-03-21 PROCEDURE — 52352 CYSTOURETERO W/STONE REMOVE: CPT | Mod: LT

## 2018-03-21 PROCEDURE — 74420 UROGRAPHY RTRGR +-KUB: CPT | Mod: 26

## 2018-03-21 RX ORDER — FENTANYL CITRATE 50 UG/ML
25 INJECTION INTRAVENOUS
Qty: 0 | Refills: 0 | Status: DISCONTINUED | OUTPATIENT
Start: 2018-03-21 | End: 2018-03-21

## 2018-03-21 RX ORDER — FENTANYL CITRATE 50 UG/ML
50 INJECTION INTRAVENOUS
Qty: 0 | Refills: 0 | Status: DISCONTINUED | OUTPATIENT
Start: 2018-03-21 | End: 2018-03-21

## 2018-03-21 RX ORDER — CEPHALEXIN 500 MG
1 CAPSULE ORAL
Qty: 9 | Refills: 0 | OUTPATIENT
Start: 2018-03-21 | End: 2018-03-23

## 2018-03-21 RX ORDER — SODIUM CHLORIDE 9 MG/ML
1000 INJECTION, SOLUTION INTRAVENOUS
Qty: 0 | Refills: 0 | Status: DISCONTINUED | OUTPATIENT
Start: 2018-03-21 | End: 2018-04-05

## 2018-03-21 RX ORDER — ONDANSETRON 8 MG/1
4 TABLET, FILM COATED ORAL ONCE
Qty: 0 | Refills: 0 | Status: DISCONTINUED | OUTPATIENT
Start: 2018-03-21 | End: 2018-03-21

## 2018-03-21 NOTE — BRIEF OPERATIVE NOTE - PROCEDURE
<<-----Click on this checkbox to enter Procedure Cystoscopy  03/21/2018  with left ureteroscopy, retrograde pyelogram, basket stone extraction, ureteral stent exchange  Active  [id number]

## 2018-03-21 NOTE — ASU DISCHARGE PLAN (ADULT/PEDIATRIC). - MEDICATION SUMMARY - MEDICATIONS TO TAKE
I will START or STAY ON the medications listed below when I get home from the hospital:    cephalexin 500 mg oral capsule  -- 1 cap(s) by mouth every 8 hours x 3 days   -- Finish all this medication unless otherwise directed by prescriber.    -- Indication: For antibiotics    omeprazole 40 mg oral delayed release capsule  -- 1 cap(s) by mouth once a day AM  -- Indication: For GERD

## 2018-03-27 ENCOUNTER — APPOINTMENT (OUTPATIENT)
Dept: UROLOGY | Facility: CLINIC | Age: 63
End: 2018-03-27
Payer: COMMERCIAL

## 2018-03-27 LAB — STONE ANALYSIS-IMP: SIGNIFICANT CHANGE UP

## 2018-03-27 PROCEDURE — 99213 OFFICE O/P EST LOW 20 MIN: CPT

## 2018-06-19 ENCOUNTER — APPOINTMENT (OUTPATIENT)
Dept: UROLOGY | Facility: CLINIC | Age: 63
End: 2018-06-19

## 2018-06-19 ENCOUNTER — APPOINTMENT (OUTPATIENT)
Dept: ULTRASOUND IMAGING | Facility: IMAGING CENTER | Age: 63
End: 2018-06-19

## 2018-08-03 ENCOUNTER — APPOINTMENT (OUTPATIENT)
Dept: INTERNAL MEDICINE | Facility: CLINIC | Age: 63
End: 2018-08-03
Payer: COMMERCIAL

## 2018-08-03 VITALS
HEART RATE: 72 BPM | DIASTOLIC BLOOD PRESSURE: 71 MMHG | HEIGHT: 75 IN | OXYGEN SATURATION: 98 % | WEIGHT: 172 LBS | BODY MASS INDEX: 21.39 KG/M2 | SYSTOLIC BLOOD PRESSURE: 140 MMHG | RESPIRATION RATE: 15 BRPM

## 2018-08-03 DIAGNOSIS — N20.0 CALCULUS OF KIDNEY: ICD-10-CM

## 2018-08-03 DIAGNOSIS — R55 SYNCOPE AND COLLAPSE: ICD-10-CM

## 2018-08-03 DIAGNOSIS — N20.1 CALCULUS OF URETER: ICD-10-CM

## 2018-08-03 PROCEDURE — 99204 OFFICE O/P NEW MOD 45 MIN: CPT

## 2018-08-03 NOTE — HISTORY OF PRESENT ILLNESS
[FreeTextEntry8] : Pt. here to establish care. He has a hx. 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 Had a EST, ECHO, all normal. No symptoms for four years until last month when again he a syncopal event.\par He had seen a cardiologist at the time.\par Recently had a procedure to remove kidney stones in his left kidney. Has had a right nephrectomy in the past for UPJ obstruction.\par

## 2018-08-03 NOTE — PLAN
[FreeTextEntry1] : Obtain recent blood work done.\par f/u with cardiology and urology.\par Repeat EGD due ,for Barretts.

## 2018-09-21 ENCOUNTER — APPOINTMENT (OUTPATIENT)
Dept: INTERNAL MEDICINE | Facility: CLINIC | Age: 63
End: 2018-09-21
Payer: COMMERCIAL

## 2018-09-21 VITALS
SYSTOLIC BLOOD PRESSURE: 123 MMHG | BODY MASS INDEX: 21.39 KG/M2 | HEIGHT: 75 IN | DIASTOLIC BLOOD PRESSURE: 72 MMHG | OXYGEN SATURATION: 98 % | WEIGHT: 172 LBS | RESPIRATION RATE: 12 BRPM | HEART RATE: 72 BPM

## 2018-09-21 PROCEDURE — 90715 TDAP VACCINE 7 YRS/> IM: CPT

## 2018-09-21 PROCEDURE — 90471 IMMUNIZATION ADMIN: CPT

## 2018-09-21 PROCEDURE — 99396 PREV VISIT EST AGE 40-64: CPT | Mod: 25

## 2018-09-21 NOTE — HEALTH RISK ASSESSMENT
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

## 2018-09-21 NOTE — PLAN
[FreeTextEntry1] : check cmp, cbc, hga1c, lipid profile.\par Tdap.\par Carotid u/s.\par screening colonoscopy.\par EGD repeat. Hx. of Barretts.

## 2018-09-24 LAB
25(OH)D3 SERPL-MCNC: 29.7 NG/ML
ALBUMIN SERPL ELPH-MCNC: 4.6 G/DL
ALP BLD-CCNC: 74 U/L
ALT SERPL-CCNC: 20 U/L
ANION GAP SERPL CALC-SCNC: 12 MMOL/L
AST SERPL-CCNC: 18 U/L
BILIRUB SERPL-MCNC: 1.6 MG/DL
BUN SERPL-MCNC: 20 MG/DL
CALCIUM SERPL-MCNC: 10 MG/DL
CHLORIDE SERPL-SCNC: 106 MMOL/L
CHOLEST SERPL-MCNC: 228 MG/DL
CHOLEST/HDLC SERPL: 3.9 RATIO
CO2 SERPL-SCNC: 27 MMOL/L
CREAT SERPL-MCNC: 0.96 MG/DL
GLUCOSE SERPL-MCNC: 97 MG/DL
HBA1C MFR BLD HPLC: 5.3 %
HDLC SERPL-MCNC: 58 MG/DL
LDLC SERPL CALC-MCNC: 146 MG/DL
POTASSIUM SERPL-SCNC: 4.6 MMOL/L
PROT SERPL-MCNC: 7.2 G/DL
PSA FREE FLD-MCNC: 32.3
PSA FREE SERPL-MCNC: 0.6 NG/ML
PSA SERPL-MCNC: 1.86 NG/ML
SODIUM SERPL-SCNC: 145 MMOL/L
TRIGL SERPL-MCNC: 121 MG/DL

## 2018-10-15 ENCOUNTER — FORM ENCOUNTER (OUTPATIENT)
Age: 63
End: 2018-10-15

## 2018-10-16 ENCOUNTER — APPOINTMENT (OUTPATIENT)
Dept: ULTRASOUND IMAGING | Facility: HOSPITAL | Age: 63
End: 2018-10-16
Payer: COMMERCIAL

## 2018-10-16 ENCOUNTER — OUTPATIENT (OUTPATIENT)
Dept: OUTPATIENT SERVICES | Facility: HOSPITAL | Age: 63
LOS: 1 days | End: 2018-10-16
Payer: COMMERCIAL

## 2018-10-16 DIAGNOSIS — Z98.890 OTHER SPECIFIED POSTPROCEDURAL STATES: Chronic | ICD-10-CM

## 2018-10-16 DIAGNOSIS — Z00.8 ENCOUNTER FOR OTHER GENERAL EXAMINATION: ICD-10-CM

## 2018-10-16 DIAGNOSIS — Z90.5 ACQUIRED ABSENCE OF KIDNEY: Chronic | ICD-10-CM

## 2018-10-16 PROCEDURE — 93880 EXTRACRANIAL BILAT STUDY: CPT | Mod: 26

## 2018-10-16 PROCEDURE — 93880 EXTRACRANIAL BILAT STUDY: CPT

## 2018-11-07 RX ORDER — OMEPRAZOLE 10 MG/1
1 CAPSULE, DELAYED RELEASE ORAL
Qty: 0 | Refills: 0 | COMMUNITY

## 2018-11-07 RX ORDER — ACETAMINOPHEN 500 MG
2 TABLET ORAL
Qty: 0 | Refills: 0 | COMMUNITY

## 2018-11-07 RX ORDER — OMEPRAZOLE 10 MG/1
0 CAPSULE, DELAYED RELEASE ORAL
Qty: 0 | Refills: 0 | COMMUNITY

## 2018-11-08 PROBLEM — Q62.11 CONGENITAL OCCLUSION OF URETEROPELVIC JUNCTION: Chronic | Status: ACTIVE | Noted: 2018-01-24

## 2018-11-08 PROBLEM — K22.70 BARRETT'S ESOPHAGUS WITHOUT DYSPLASIA: Chronic | Status: ACTIVE | Noted: 2018-01-24

## 2019-07-22 PROBLEM — M25.561 PAIN IN RIGHT KNEE: Chronic | Status: ACTIVE | Noted: 2018-03-08

## 2019-07-22 PROBLEM — N20.1 CALCULUS OF URETER: Chronic | Status: ACTIVE | Noted: 2018-01-24

## 2019-07-22 PROBLEM — K21.9 GASTRO-ESOPHAGEAL REFLUX DISEASE WITHOUT ESOPHAGITIS: Chronic | Status: ACTIVE | Noted: 2018-01-03

## 2019-07-24 ENCOUNTER — FORM ENCOUNTER (OUTPATIENT)
Age: 64
End: 2019-07-24

## 2019-07-25 ENCOUNTER — APPOINTMENT (OUTPATIENT)
Dept: ULTRASOUND IMAGING | Facility: CLINIC | Age: 64
End: 2019-07-25
Payer: COMMERCIAL

## 2019-07-25 ENCOUNTER — OUTPATIENT (OUTPATIENT)
Dept: OUTPATIENT SERVICES | Facility: HOSPITAL | Age: 64
LOS: 1 days | End: 2019-07-25
Payer: COMMERCIAL

## 2019-07-25 DIAGNOSIS — Z90.5 ACQUIRED ABSENCE OF KIDNEY: Chronic | ICD-10-CM

## 2019-07-25 DIAGNOSIS — N20.0 CALCULUS OF KIDNEY: ICD-10-CM

## 2019-07-25 DIAGNOSIS — Z98.890 OTHER SPECIFIED POSTPROCEDURAL STATES: Chronic | ICD-10-CM

## 2019-07-25 DIAGNOSIS — Z00.8 ENCOUNTER FOR OTHER GENERAL EXAMINATION: ICD-10-CM

## 2019-07-25 PROCEDURE — 76775 US EXAM ABDO BACK WALL LIM: CPT

## 2019-07-25 PROCEDURE — 76775 US EXAM ABDO BACK WALL LIM: CPT | Mod: 26

## 2019-08-06 ENCOUNTER — OTHER (OUTPATIENT)
Age: 64
End: 2019-08-06

## 2019-09-23 ENCOUNTER — NON-APPOINTMENT (OUTPATIENT)
Age: 64
End: 2019-09-23

## 2019-09-23 ENCOUNTER — APPOINTMENT (OUTPATIENT)
Dept: INTERNAL MEDICINE | Facility: CLINIC | Age: 64
End: 2019-09-23
Payer: COMMERCIAL

## 2019-09-23 VITALS
WEIGHT: 169 LBS | HEART RATE: 78 BPM | BODY MASS INDEX: 21.01 KG/M2 | SYSTOLIC BLOOD PRESSURE: 120 MMHG | HEIGHT: 75 IN | OXYGEN SATURATION: 98 % | DIASTOLIC BLOOD PRESSURE: 80 MMHG

## 2019-09-23 PROCEDURE — 90686 IIV4 VACC NO PRSV 0.5 ML IM: CPT

## 2019-09-23 PROCEDURE — G0008: CPT

## 2019-09-23 PROCEDURE — 93000 ELECTROCARDIOGRAM COMPLETE: CPT

## 2019-09-23 PROCEDURE — 99396 PREV VISIT EST AGE 40-64: CPT | Mod: 25

## 2019-09-23 NOTE — HEALTH RISK ASSESSMENT
[Excellent] : ~his/her~  mood as  excellent [Yes] : Yes [2 - 3 times a week (3 pts)] : 2 - 3  times a week (3 points) [1 or 2 (0 pts)] : 1 or 2 (0 points) [Never (0 pts)] : Never (0 points) [No] : In the past 12 months have you used drugs other than those required for medical reasons? No [No falls in past year] : Patient reported no falls in the past year [0] : 2) Feeling down, depressed, or hopeless: Not at all (0) [Patient reported colonoscopy was normal] : Patient reported colonoscopy was normal [Fully functional (bathing, dressing, toileting, transferring, walking, feeding)] : Fully functional (bathing, dressing, toileting, transferring, walking, feeding) [Fully functional (using the telephone, shopping, preparing meals, housekeeping, doing laundry, using] : Fully functional and needs no help or supervision to perform IADLs (using the telephone, shopping, preparing meals, housekeeping, doing laundry, using transportation, managing medications and managing finances) [Reports changes in hearing] : Reports changes in hearing [Designated Healthcare Proxy] : Designated healthcare proxy [Name: ___] : Health Care Proxy's Name: [unfilled]  [Relationship: ___] : Relationship: [unfilled] [I will adhere to the patient's wishes as expressed in the advance directive except as noted below.] : I will adhere to the patient's wishes as expressed in the advance directive except as noted below [FreeTextEntry1] : intermittent dysphagia;  [] : No [de-identified] : Urology. [Audit-CScore] : 4 [de-identified] : physical exercise. [[id number]] : 0 [de-identified] : good [Reports changes in dental health] : Reports no changes in dental health [de-identified] : tinnitus [ColonoscopyDate] : 2014 [de-identified] : crowns [de-identified] : progressive glasses

## 2019-09-23 NOTE — PHYSICAL EXAM
[No Acute Distress] : no acute distress [Well Nourished] : well nourished [Well Developed] : well developed [Well-Appearing] : well-appearing [Normal Sclera/Conjunctiva] : normal sclera/conjunctiva [PERRL] : pupils equal round and reactive to light [EOMI] : extraocular movements intact [Normal Outer Ear/Nose] : the outer ears and nose were normal in appearance [Normal Oropharynx] : the oropharynx was normal [No JVD] : no jugular venous distention [No Lymphadenopathy] : no lymphadenopathy [Supple] : supple [Thyroid Normal, No Nodules] : the thyroid was normal and there were no nodules present [No Respiratory Distress] : no respiratory distress  [No Accessory Muscle Use] : no accessory muscle use [Clear to Auscultation] : lungs were clear to auscultation bilaterally [Normal Rate] : normal rate  [Regular Rhythm] : with a regular rhythm [Normal S1, S2] : normal S1 and S2 [No Murmur] : no murmur heard [No Carotid Bruits] : no carotid bruits [No Abdominal Bruit] : a ~M bruit was not heard ~T in the abdomen [Pedal Pulses Present] : the pedal pulses are present [No Varicosities] : no varicosities [No Edema] : there was no peripheral edema [No Palpable Aorta] : no palpable aorta [No Extremity Clubbing/Cyanosis] : no extremity clubbing/cyanosis [Soft] : abdomen soft [Non Tender] : non-tender [Non-distended] : non-distended [No HSM] : no HSM [No Masses] : no abdominal mass palpated [Normal Bowel Sounds] : normal bowel sounds [Normal Posterior Cervical Nodes] : no posterior cervical lymphadenopathy [Normal Anterior Cervical Nodes] : no anterior cervical lymphadenopathy [No Spinal Tenderness] : no spinal tenderness [No CVA Tenderness] : no CVA  tenderness [Grossly Normal Strength/Tone] : grossly normal strength/tone [No Joint Swelling] : no joint swelling [No Rash] : no rash [Coordination Grossly Intact] : coordination grossly intact [No Focal Deficits] : no focal deficits [Normal Gait] : normal gait [Normal Affect] : the affect was normal [Deep Tendon Reflexes (DTR)] : deep tendon reflexes were 2+ and symmetric [Normal Insight/Judgement] : insight and judgment were intact

## 2020-01-08 ENCOUNTER — CLINICAL ADVICE (OUTPATIENT)
Age: 65
End: 2020-01-08

## 2020-01-09 ENCOUNTER — FORM ENCOUNTER (OUTPATIENT)
Age: 65
End: 2020-01-09

## 2020-01-10 ENCOUNTER — APPOINTMENT (OUTPATIENT)
Dept: ULTRASOUND IMAGING | Facility: CLINIC | Age: 65
End: 2020-01-10
Payer: COMMERCIAL

## 2020-01-10 ENCOUNTER — OUTPATIENT (OUTPATIENT)
Dept: OUTPATIENT SERVICES | Facility: HOSPITAL | Age: 65
LOS: 1 days | End: 2020-01-10
Payer: COMMERCIAL

## 2020-01-10 DIAGNOSIS — N20.0 CALCULUS OF KIDNEY: ICD-10-CM

## 2020-01-10 DIAGNOSIS — Z90.5 ACQUIRED ABSENCE OF KIDNEY: Chronic | ICD-10-CM

## 2020-01-10 DIAGNOSIS — Z98.890 OTHER SPECIFIED POSTPROCEDURAL STATES: Chronic | ICD-10-CM

## 2020-01-10 PROCEDURE — 76770 US EXAM ABDO BACK WALL COMP: CPT

## 2020-01-10 PROCEDURE — 76770 US EXAM ABDO BACK WALL COMP: CPT | Mod: 26

## 2020-01-14 LAB — BACTERIA UR CULT: NORMAL

## 2020-01-17 DIAGNOSIS — R31.29 OTHER MICROSCOPIC HEMATURIA: ICD-10-CM

## 2020-01-17 LAB
APPEARANCE: CLEAR
BACTERIA: NEGATIVE
BILIRUBIN URINE: NEGATIVE
BLOOD URINE: ABNORMAL
COLOR: NORMAL
GLUCOSE QUALITATIVE U: NEGATIVE
HYALINE CASTS: 0 /LPF
KETONES URINE: NEGATIVE
LEUKOCYTE ESTERASE URINE: NEGATIVE
MICROSCOPIC-UA: NORMAL
NITRITE URINE: NEGATIVE
PH URINE: 6
PROTEIN URINE: NEGATIVE
RED BLOOD CELLS URINE: 3 /HPF
SPECIFIC GRAVITY URINE: 1.02
SQUAMOUS EPITHELIAL CELLS: 0 /HPF
UROBILINOGEN URINE: NORMAL
WHITE BLOOD CELLS URINE: 0 /HPF

## 2020-01-21 ENCOUNTER — TRANSCRIPTION ENCOUNTER (OUTPATIENT)
Age: 65
End: 2020-01-21

## 2020-01-22 ENCOUNTER — OUTPATIENT (OUTPATIENT)
Dept: OUTPATIENT SERVICES | Facility: HOSPITAL | Age: 65
LOS: 1 days | End: 2020-01-22
Payer: COMMERCIAL

## 2020-01-22 ENCOUNTER — RESULT REVIEW (OUTPATIENT)
Age: 65
End: 2020-01-22

## 2020-01-22 DIAGNOSIS — Z90.5 ACQUIRED ABSENCE OF KIDNEY: Chronic | ICD-10-CM

## 2020-01-22 DIAGNOSIS — Z98.890 OTHER SPECIFIED POSTPROCEDURAL STATES: Chronic | ICD-10-CM

## 2020-01-22 DIAGNOSIS — K22.70 BARRETT'S ESOPHAGUS WITHOUT DYSPLASIA: ICD-10-CM

## 2020-01-22 PROCEDURE — 88305 TISSUE EXAM BY PATHOLOGIST: CPT | Mod: 26

## 2020-01-22 PROCEDURE — 43239 EGD BIOPSY SINGLE/MULTIPLE: CPT

## 2020-01-22 PROCEDURE — 88312 SPECIAL STAINS GROUP 1: CPT

## 2020-01-22 PROCEDURE — 88305 TISSUE EXAM BY PATHOLOGIST: CPT

## 2020-01-22 PROCEDURE — 88312 SPECIAL STAINS GROUP 1: CPT | Mod: 26

## 2020-01-22 RX ORDER — SODIUM CHLORIDE 9 MG/ML
1000 INJECTION INTRAMUSCULAR; INTRAVENOUS; SUBCUTANEOUS
Refills: 0 | Status: DISCONTINUED | OUTPATIENT
Start: 2020-01-22 | End: 2020-02-06

## 2020-01-22 RX ADMIN — SODIUM CHLORIDE 75 MILLILITER(S): 9 INJECTION INTRAMUSCULAR; INTRAVENOUS; SUBCUTANEOUS at 09:15

## 2020-01-22 NOTE — CHART NOTE - NSCHARTNOTEFT_GEN_A_CORE
ESOPHAGOGASTRODUODENOSCOPY (EGD) REPORT    INDICATION: Dysphagia    ANESTHESIOLOGIST: June Bobby MD    MEDICATION: MAC    COMPLICATIONS: None    --------------------------------------------------------------------------------------------------------------------------------------------------------------------    PRE-ANESTHESIA ASSESSMENT:    1. The risks and benefits of the procedure and the sedation options and risks were discussed with the patient.  All questions were answered and informed consent was obtained.    2. Prior to the procedure, a History and Physical was performed, and patient medications, allergies and sensitivities have been reviewed.  The patient's tolerance of preious anesthesia has been reviewed.    3. After obtaining informed consent, the endoscope was passed under direct vision.  Throughout the procedure, the patient's blood pressure, pulse, and oxygen saturation were monitored continuously.  The gastroscope was introduced through the mouth, and advanced to the 3rd portion of the duodenum.  The Upper G.I. endoscopy was accomplihed without difficulty.  The patient tolerated the procedure well.    FINDINGS:    A. ESOPHAGUS: Mild esophagitis in the distal esophagus.  Z line irregular at 42 cm from the incisors.  Minimal esophageal ring at GE junction; disrupted with cold biopsy forcep.  Cold biopsies taken to rule out intestinal metaplasia.  Additional random biopsies taken from mid esophagus.  No ulcerations or mass lesions.  No stricture.    B. STOMACH: Patchy moderate gastritis in antrum.  Cold bioopsies taken to rule out Helicobacter pylori.  No gastric ulcer, mass lesions or polyps.    C. DUODENUM: Linear superficial ulcer in bulb.  Random cold biopsies taken from second portion to rule out Celiac disease.    ESTIMATED BLOOD LOSS: 5 cc.    IMPRESSION:  1. Esophagitis  2. Gastritis  3. Duodenal bulb ulcer    RECOMMENDATIONS:  1. Proton pump inhibitor daily  2. Follow-up biopsy results  3. Follow-up in office in 2 weeks

## 2020-01-26 ENCOUNTER — FORM ENCOUNTER (OUTPATIENT)
Age: 65
End: 2020-01-26

## 2020-01-27 ENCOUNTER — APPOINTMENT (OUTPATIENT)
Dept: CT IMAGING | Facility: CLINIC | Age: 65
End: 2020-01-27
Payer: COMMERCIAL

## 2020-01-27 ENCOUNTER — OUTPATIENT (OUTPATIENT)
Dept: OUTPATIENT SERVICES | Facility: HOSPITAL | Age: 65
LOS: 1 days | End: 2020-01-27
Payer: COMMERCIAL

## 2020-01-27 DIAGNOSIS — Z98.890 OTHER SPECIFIED POSTPROCEDURAL STATES: Chronic | ICD-10-CM

## 2020-01-27 DIAGNOSIS — R31.29 OTHER MICROSCOPIC HEMATURIA: ICD-10-CM

## 2020-01-27 DIAGNOSIS — Z90.5 ACQUIRED ABSENCE OF KIDNEY: Chronic | ICD-10-CM

## 2020-01-27 PROCEDURE — 74178 CT ABD&PLV WO CNTR FLWD CNTR: CPT

## 2020-01-27 PROCEDURE — 74178 CT ABD&PLV WO CNTR FLWD CNTR: CPT | Mod: 26

## 2020-01-27 PROCEDURE — 82565 ASSAY OF CREATININE: CPT

## 2020-01-31 LAB
25(OH)D3 SERPL-MCNC: 24.7 NG/ML
ALBUMIN SERPL ELPH-MCNC: 4.6 G/DL
ALP BLD-CCNC: 67 U/L
ALT SERPL-CCNC: 16 U/L
ANION GAP SERPL CALC-SCNC: 13 MMOL/L
AST SERPL-CCNC: 17 U/L
BASOPHILS # BLD AUTO: 0.06 K/UL
BASOPHILS NFR BLD AUTO: 1.3 %
BILIRUB SERPL-MCNC: 1.5 MG/DL
BUN SERPL-MCNC: 18 MG/DL
CALCIUM SERPL-MCNC: 10.1 MG/DL
CHLORIDE SERPL-SCNC: 101 MMOL/L
CHOLEST SERPL-MCNC: 201 MG/DL
CHOLEST/HDLC SERPL: 3.9 RATIO
CO2 SERPL-SCNC: 26 MMOL/L
CREAT SERPL-MCNC: 1.05 MG/DL
EOSINOPHIL # BLD AUTO: 0.24 K/UL
EOSINOPHIL NFR BLD AUTO: 5.3 %
ESTIMATED AVERAGE GLUCOSE: 105 MG/DL
GLUCOSE SERPL-MCNC: 88 MG/DL
HBA1C MFR BLD HPLC: 5.3 %
HCT VFR BLD CALC: 45.3 %
HDLC SERPL-MCNC: 52 MG/DL
HGB BLD-MCNC: 14.8 G/DL
IMM GRANULOCYTES NFR BLD AUTO: 0 %
LDLC SERPL CALC-MCNC: 134 MG/DL
LYMPHOCYTES # BLD AUTO: 2.13 K/UL
LYMPHOCYTES NFR BLD AUTO: 46.6 %
MAN DIFF?: NORMAL
MCHC RBC-ENTMCNC: 30 PG
MCHC RBC-ENTMCNC: 32.7 GM/DL
MCV RBC AUTO: 91.9 FL
MONOCYTES # BLD AUTO: 0.49 K/UL
MONOCYTES NFR BLD AUTO: 10.7 %
NEUTROPHILS # BLD AUTO: 1.65 K/UL
NEUTROPHILS NFR BLD AUTO: 36.1 %
PLATELET # BLD AUTO: 203 K/UL
POTASSIUM SERPL-SCNC: 4.8 MMOL/L
PROT SERPL-MCNC: 6.9 G/DL
PSA FREE FLD-MCNC: 36 %
PSA FREE SERPL-MCNC: 0.6 NG/ML
PSA SERPL-MCNC: 1.67 NG/ML
RBC # BLD: 4.93 M/UL
RBC # FLD: 13.2 %
SODIUM SERPL-SCNC: 141 MMOL/L
TRIGL SERPL-MCNC: 77 MG/DL
WBC # FLD AUTO: 4.57 K/UL

## 2020-02-05 LAB — URINE CYTOLOGY: NORMAL

## 2020-02-07 ENCOUNTER — APPOINTMENT (OUTPATIENT)
Dept: UROLOGY | Facility: CLINIC | Age: 65
End: 2020-02-07
Payer: COMMERCIAL

## 2020-02-07 VITALS
SYSTOLIC BLOOD PRESSURE: 144 MMHG | RESPIRATION RATE: 14 BRPM | WEIGHT: 175 LBS | HEART RATE: 66 BPM | HEIGHT: 75 IN | DIASTOLIC BLOOD PRESSURE: 80 MMHG | BODY MASS INDEX: 21.76 KG/M2

## 2020-02-07 PROCEDURE — 52000 CYSTOURETHROSCOPY: CPT

## 2020-02-12 LAB — URINE CYTOLOGY: NORMAL

## 2020-07-09 NOTE — ED PROVIDER NOTE - CROS ED RESP ALL NEG
OBSTETRICS H&P    Patient: Rianna Sood Date: 2020   : 1986 Attending: Gary Barker MD   34 year old female Admit Date: 2020      Chief Complaint     Chief Complaint   Patient presents with   • Contractions       History of Present Illness   Rianna Sood is a 34 year old  female at 39w5d  gestation who presents with painful contractions. Denies VB, LOF. Endorses good FM. Denies HA/vision changes/RUQ pain/edema/N/E/F/C/dysuria.       Prenatal Labs  Blood type/Antibody: A+/neg  Rubella: Immune  HepBSAg: Neg  RPR: Neg  GBS: neg  HIV: Neg  Varicella: Immune  GC/CT: Neg  2nd Trimester H&H: 13.6/38.6  3rd Trimester HIV: non r    Lab Results   Component Value Date    RUBEL IMMUNE 2019    HBAG NEGATIVE 2019    HIV NEGATIVE 2020    HIV NEGATIVE 2019    VARIC IMMUNE 2019    HGB 13.6 2020    HGB 12.5 2017    RPR NON REACTIVE 2019        OB History  #: 1, Date: None, Sex: None, Weight: None, GA: None, Delivery: None, Apgar1: None, Apgar5: None, Living: None, Birth Comments: None    #: 2, Date: None, Sex: None, Weight: None, GA: None, Delivery: None, Apgar1: None, Apgar5: None, Living: None, Birth Comments: None      Gyn Hx: Denies h/o STIs or abnormal pap smears  PMH: none  PSH: none  Soc: Denies tobacco, alcohol and drug use  Allergies: none  Meds: none      Physical Exam     Vital Last Value 24 Hour Range   Temperature 98.1 °F (36.7 °C) Temp  Min: 98.1 °F (36.7 °C)  Max: 98.1 °F (36.7 °C)   Pulse (!) 109 Pulse  Min: 109  Max: 109   Respiratory 14 Resp  Min: 14  Max: 14   Blood Pressure 109/76 BP  Min: 109/76  Max: 109/76   Pulse Oximetry   No data recorded   CVP   No data recorded     Visit Vitals  /76 (BP Location: RUE - Right upper extremity, Patient Position: Semi-Fishman's)   Pulse (!) 109   Temp 98.1 °F (36.7 °C) (Oral)   Resp 14   Ht 5' (1.524 m)   Wt 60.7 kg   BMI 26.13 kg/m²        General: well developed, well nourished. No acute  distress.  Resp: Respirations are non-labored, no accessory muscle usage. No respiratory distress.  CV: Normal peripheral perfusion.  Abdomen: Soft, gravid, non-tender.  : Normal external genitalia  Neurological: A&O x3.  Skin: Warm, dry, normal for ethnicity.   Psychiatric: Cooperative.     Limited BSUS: cephalic  Membranes: intact  FHT: 130/mod/r  [place]: q2-3 min/irregular  SVE: 6/100/-1    Assessment and Plan     Rianna Sood is a 34 year old  female at 39w5d gestation who presented with painful contractions.    - Admit to L&D  - Draw CBC and T&S  - cEFM and [place]  - FWB: Cat I, reassuring  - GBS: Neg  - Labor: Active labour, will expectantly manage.   - Pain: MARA infusing    Attending: Dr. Mj Pena MD   negative...

## 2020-07-11 NOTE — H&P PST ADULT - NSALCOHOLAMT_GEN_A_CORE_SD
"Nursing Notes:   Stephanie Powell LPN  7/8/2020 10:23 AM  Signed  Chief Complaint   Patient presents with     Ear Problem     Pt present to clinic today for right ear pain that started last night.  Initial Pulse 116   Temp 98.9  F (37.2  C) (Tympanic)   Resp 24   Wt 17.6 kg (38 lb 12.8 oz)  Estimated body mass index is 16.57 kg/m  as calculated from the following:    Height as of 3/19/20: 1.016 m (3' 4\").    Weight as of 3/19/20: 17.1 kg (37 lb 11.2 oz).  Medication Reconciliation: complete    Stephanie Powell LPN     SUBJECTIVE:  3 year old female here with parents for concerns for right ear pain. Her mother is a pharmacist and used some leftover antibiotic drops last night. Patient has no pain today. No drainage present. Has been swimming a lot. No fever.    REVIEW OF SYSTEMS:    As above      No Known Allergies    OBJECTIVE:  Pulse 116   Temp 98.9  F (37.2  C) (Tympanic)   Resp 24   Wt 17.6 kg (38 lb 12.8 oz)     EXAM:  General Appearance: Alert. No acute distress  Ears: No tenderness to palpation of ears.  Canals appear normal.  TMs normal.  There is a slight amount of greenish colored discharge in the right canal, potentially soften wax due to use of the antibiotic drop.  Psychiatric: Normal affect and mentation      ASSESSMENT/PLAN:    ICD-10-CM    1. Otalgia, right  H92.01      No clear etiology for ear pain.  Potentially she was starting to have otitis, but it is difficult to tell at this point.  No otitis media.  We discussed watching and seeing if she has more pain, especially pressing on the tragus.  If so, they can call for antibiotics.    Follow-up as needed     Jean Paris MD    This document was prepared using a combination of typing and voice generated software.  While every attempt was made for accuracy, spelling and grammatical errors may exist.   " 1-2 drinks

## 2021-01-20 NOTE — ED PROVIDER NOTE - CADM POA URETHRAL CATHETER
Referred by: Gisele Soto MD; Medical Diagnosis (from order):    Diagnosis Information      Diagnosis    847.0 (ICD-9-CM) - S16.1XXA (ICD-10-CM) - Strain of neck muscle, initial encounter    719.41 (ICD-9-CM) - M25.511, M25.512 (ICD-10-CM) - Bilateral shoulder pain, unspecified chronicity                Physical Therapy -  Daily Treatment Note    Visit:  10     SUBJECTIVE                                                                                                             Had MRI and has gotten result and will be seeing orthopedic specialist next month 2-26 for further options.  Feels like her neck mobility is better today than it has been in the past.      Pain / Symptoms:  Pain rating (out of 10): Current: 2     OBJECTIVE                                                                                                                        TREATMENT                                                                                                                  Therapeutic Exercise:  Upper body ergometer level 1.0, 5 minutes forward  Upper trap stretch 2 x 30 seconds bilaterally  Levator stretch  2 x 30 seconds bilaterally  [place] stretch with towel 2 x 30 seconds bilaterally   Luis Head press shoulder press thoracic lift, cover the bones all x 5 x 3 sec holds  Roberto band exercises: red band x 8 of each   -Head cradle  -Salute  -Side pull  -Sharon and arrow  -Sash   -ER pull apart      Skilled input: verbal instruction/cues    Writer verbally educated and received verbal consent for hand placement, positioning of patient, and techniques to be performed today from patient     Home Exercise Program: Stretches   Luis      ASSESSMENT                                                                                                             Was able to return to more strengthening this session than previous as patient reported improved mobility and decreased pain.  Patient tolerated session well with moderate  muscle fatigue post session.  Will be seeing Dr. Perez next month for consultation for her neck.   Pain/symptoms after session: 1    Patient Education:   Results of above outlined education: Verbalizes understanding and Needs reinforcement    Patient progress, plan of care and goals discussed face to face between providing therapist and assistant.            Procedures and total treatment time documented Time Entry flowsheet.   No

## 2021-06-17 NOTE — PACU DISCHARGE NOTE - AIRWAY PATENCY:
Satisfactory Topical Clindamycin Pregnancy And Lactation Text: This medication is Pregnancy Category B and is considered safe during pregnancy. It is unknown if it is excreted in breast milk.

## 2021-11-10 NOTE — PACU DISCHARGE NOTE - THE ANESTHESIA ORDERS USED IN THE PACU ORDER SET WILL BE DISCONTINUED UPON TRANSFER OF THIS PATIENT
"Endometrial Biopsy    Zaida Lao is a 44 year old P2 who presents today for endometrial biopsy secondary to menomenorrhagia.                                                                        Time Out - \"Pause for the Cause\"  Just before the procedure begins, through verbal and active participation of team members, verify:    Initials   Patient Name    JRF   Patient Date of Birth JRF   Procedure to be performed  JRF   Site, laterality, level or multiples, noting patient position   JRF   Relevant images or diagnostics available/displayed   JRF   Implants, special equipment or special requirements        JRF     Consent:  Written consent signed and scanned into medical record.    Pregnancy test: ND, pt s/p BTL    Using a Graves speculum, the cervix was visualized. The cervix was prepped with betadine. The endometrial pipelle was advanced through the cervix without difficulty to a depth of 8cm and a sample collected. No additional pass was made.       EBL: <5cc  Complications:  none  Pathology: EMB sample was sent to pathology.  Tolerance of Procedure:  Patient tolerated the procedure well.     Patient was instructed to call if she experiences any heavy bleeding, severe cramping, or abnormal vaginal discharge.  May take ibuprofen 400-800 mg PO TID PRN or naproxen 500 mg PO BID for cramping.    A: 44 year old P2 here undergoing EMB 2/2 to [facility].    Plan:  -Will send specimen to pathology  -Post procedure instructions given to patient  -Will call patient to discuss results when they are available    Hany Thurston MD  Professor, OB/GYN  Urogynecologist        " Statement Selected

## 2022-08-08 ENCOUNTER — APPOINTMENT (OUTPATIENT)
Dept: DERMATOLOGY | Facility: CLINIC | Age: 67
End: 2022-08-08

## 2022-08-08 ENCOUNTER — NON-APPOINTMENT (OUTPATIENT)
Age: 67
End: 2022-08-08

## 2022-08-08 DIAGNOSIS — Z00.00 ENCOUNTER FOR GENERAL ADULT MEDICAL EXAMINATION W/OUT ABNORMAL FINDINGS: ICD-10-CM

## 2022-08-08 DIAGNOSIS — D22.9 MELANOCYTIC NEVI, UNSPECIFIED: ICD-10-CM

## 2022-08-08 DIAGNOSIS — L82.1 OTHER SEBORRHEIC KERATOSIS: ICD-10-CM

## 2022-08-08 DIAGNOSIS — D18.01 HEMANGIOMA OF SKIN AND SUBCUTANEOUS TISSUE: ICD-10-CM

## 2022-08-08 PROCEDURE — 99203 OFFICE O/P NEW LOW 30 MIN: CPT

## 2022-08-08 NOTE — HISTORY OF PRESENT ILLNESS
[FreeTextEntry1] : Patient presents for skin examination. [de-identified] : Denies new, changing, bleeding or tender lesions on the skin over the past year.\par

## 2022-08-08 NOTE — PHYSICAL EXAM
[Alert] : alert [Oriented x 3] : ~L oriented x 3 [Well Nourished] : well nourished [Full Body Skin Exam Performed] : performed [FreeTextEntry3] : A full skin exam was performed including the scalp, face (including lips, ears, nose and eyes), neck, chest, abdomen, back, buttocks, upper extremities and lower extremities.  The patient declined examination of the genitalia.  \par The exam revealed the following benign growths:\par [place] pigmented nevi.\par Seborrheic keratoses - includes the left mid-back region.\par Cherry angioma - numerous on the abdomen.\par

## 2022-08-23 NOTE — ASU PATIENT PROFILE, ADULT - NS PRO ABUSE SCREEN SUSPICION NEGLECT YN
Methotrexate Counseling:  Patient counseled regarding adverse effects of methotrexate including but not limited to nausea, vomiting, abnormalities in liver function tests. Patients may develop mouth sores, rash, diarrhea, and abnormalities in blood counts. The patient understands that monitoring is required including LFT's and blood counts.  There is a rare possibility of scarring of the liver and lung problems that can occur when taking methotrexate. Persistent nausea, loss of appetite, pale stools, dark urine, cough, and shortness of breath should be reported immediately. Patient advised to discontinue methotrexate treatment at least three months before attempting to become pregnant.  I discussed the need for folate supplements while taking methotrexate.  These supplements can decrease side effects during methotrexate treatment. The patient verbalized understanding of the proper use and possible adverse effects of methotrexate.  All of the patient's questions and concerns were addressed. no

## 2023-05-03 NOTE — ED ADULT TRIAGE NOTE - HEART RATE (BEATS/MIN)
[x] [facility] - [facility] Therapy  [address]  [place]: [phone]   F: [phone]    Physical Therapy Daily Treatment Note      Date:  5/3/2023  Patient Name:  Simona Trent    :  1971  MRN: [id number]  Physician: Jenny Jung MD                                 Insurance: Maudine Vicky ([id number] combined with OT and speech, BOMN)  Medical Diagnosis: M51.9 Lumbar disc disease, M54.2 Neck pain                          Rehab Codes: M54.59 Low back pain, M54.2 Cervicalgia  Onset Date:                                  Next 's appt: 23  Visit# / total visits:   Cancels/No Shows: 1/0    Subjective: Denies issues after last visit. States her daughters dog yanked her yesterday and she has had increased back pain with symptoms down B LE's since. Pain:  [x] Yes  [] No Location/Pain Rating: (0-10 scale) : Cervical Spine into shoulders 6/10;  Lower back with constant ache down B LEs to feet/toes  6/10   Pain altered Tx:  [x] No  [] Yes  Action:    Comments: Emphasis on avoidance of increasing symptoms    Objective:  Modalities:   Precautions: NONE  Exercises:    [address] [facility] Exercise Log    Date of Eval: 23                                Primary PT: Holly Mac, PT    Things to Focus On (goals): LE/UE strengthening        Date 4/26/23 5/1/23 5/3/23    Visit #     Walk F/L/R 2 Laps NO UE ROM 2 Laps NO UE ROM 2 Laps NO UE ROM    Marching 10x 2 Laps 2 Laps    LE Exercise       Squats 10x5\" 10x5\" 10x5\"    Step-Ups F/L    Add   Heel-toe raises 10x 10x 10x    SLR F/L/R 10x 10x 10x    Lunges       Knee flex/ext  10x 10x           UE exercises Neck Deep Neck Deep Neck Deep    Retro Shoulder Shrugs 10x 10x 10x    Retractions 10x 10x 10x    Horiz abd/add 10x 10x 10x    Alt flex/Ext 10x 10x 10x    Abd/add 10x 10x     PNF    Add   Bicep Curls 10x 10x 10x    IR/ER (wipers) 10x 10x 10x           Ball Shapes cw/ccw    Add 57

## 2023-06-26 DIAGNOSIS — R53.83 OTHER FATIGUE: ICD-10-CM

## 2023-06-26 DIAGNOSIS — R82.994 HYPERCALCIURIA: ICD-10-CM

## 2023-07-21 ENCOUNTER — APPOINTMENT (OUTPATIENT)
Dept: INTERNAL MEDICINE | Facility: CLINIC | Age: 68
End: 2023-07-21

## 2023-07-26 NOTE — ED PROVIDER NOTE - SKIN NEGATIVE STATEMENT, MLM
NAME OF THE PATIENT: Clive Woody    YOB: 1934    PLACE OF SERVICE:  [facility]    This is a subsequent visit.    HPI: Mr. Clive Woody is an 89-year-old male with a history of dementia with seizure disorder and schizophrenia. He is unable to care for himself and manage his affairs.  He requires supportive care.    PAST MEDICAL HISTORY:  As per nursing home list.  Dementia, weakness, psychosis, seizures, hypertension, diabetes, hypothyroidism, aphasia, schizophrenia.    CURRENT MEDICATIONS:  As per nursing home list.  Medications reviewed.    ALLERGIES:  As per nursing home list.  No known allergies.    SOCIAL HISTORY:  As per nursing home list. The patient is a nonsmoker and nondrinker.    FAMILY HISTORY:  As per nursing home list.  The patient does not recall his family.    REVIEW OF SYSTEMS:  All 12 systems reviewed and pertaining covered in history and physical, the rest are negative. The patient denies any fever, chills, or chest pain at this time.    PHYSICAL EXAMINATION  GENERAL: This is a well-developed, well-nourished male, sitting in a chair, in no acute distress.  VITAL SIGNS:  As recorded and reviewed from EMR.  Blood pressure 124/80.  Pulse 76.  Respirations 16.  Temperature 97.5.  EYES:  The patient's sclerae white.  The pupils were equal and round.  ENT:  The patient's external ears were normal and the otoscopic examination was negative.  NECK:  Supple.  There were no palpable masses.  Thyroid was not enlarged and there were no carotid bruits.  RESPIRATORY:  The patient had normal inspirations and expirations.  The breath sounds were equal bilaterally and clear to auscultation.  CARDIOVASCULAR:  The patient had S1 normal, split S2 without obvious rubs, clicks, or murmurs.  GASTROINTESTINAL:  There was no hepatosplenomegaly.  There were no palpable masses and no inguinal nodes.  LYMPHATIC:  The patient had no axillary, groin, or lymphadenopathy.  MUSCULOSKELETAL:  The  patient had normal gait.  The joints appeared to be normal without evidence of deformity.  Grossly the muscles had good range of motion and were without effusion.  EXTREMITIES:  There was no evidence of peripheral edema.  NEUROLOGIC:  The patient had normal cranial nerves.  The reflexes, sensory, and motor examination were grossly within normal limits.  PSYCHIATRIC: The patient had normal judgment and insight.  The patient had no obvious mood defect including depression, anxiety, and/or agitation.  MENTAL STATUS EXAMINATION: The patient is alert and oriented x2.    LAB WORK: Laboratory studies were reviewed from prior visit.    ASSESSMENT AND PLAN:  Dementia, unchanged.  Seizure disorder, on anti-epileptic.  Schizophrenia, on medication.  Weakness, on PT/OT.  Fall risk, on fall precaution.  Hypertension, med controlled.  Hypothyroidism, on levothyroxine.  Diabetes, on insulin.  Medication list reviewed and discussed with the family.  Hospital chart reviewed.    no abrasions, no jaundice, no lesions, no pruritis, and no rashes.

## 2023-10-17 ENCOUNTER — APPOINTMENT (OUTPATIENT)
Dept: GASTROENTEROLOGY | Facility: CLINIC | Age: 68
End: 2023-10-17
Payer: MEDICARE

## 2023-10-17 VITALS
TEMPERATURE: 98.2 F | OXYGEN SATURATION: 96 % | RESPIRATION RATE: 14 BRPM | WEIGHT: 168 LBS | BODY MASS INDEX: 20.89 KG/M2 | SYSTOLIC BLOOD PRESSURE: 144 MMHG | HEART RATE: 80 BPM | HEIGHT: 75 IN | DIASTOLIC BLOOD PRESSURE: 91 MMHG

## 2023-10-17 DIAGNOSIS — K22.70 BARRETT'S ESOPHAGUS W/OUT DYSPLASIA: ICD-10-CM

## 2023-10-17 DIAGNOSIS — Z12.11 ENCOUNTER FOR SCREENING FOR MALIGNANT NEOPLASM OF COLON: ICD-10-CM

## 2023-10-17 PROCEDURE — 99204 OFFICE O/P NEW MOD 45 MIN: CPT

## 2023-10-17 RX ORDER — SODIUM SULFATE, POTASSIUM SULFATE AND MAGNESIUM SULFATE 1.6; 3.13; 17.5 G/177ML; G/177ML; G/177ML
17.5-3.13-1.6 SOLUTION ORAL
Qty: 1 | Refills: 0 | Status: COMPLETED | COMMUNITY
Start: 2023-10-17 | End: 2023-10-18

## 2023-10-27 ENCOUNTER — APPOINTMENT (OUTPATIENT)
Dept: GASTROENTEROLOGY | Facility: HOSPITAL | Age: 68
End: 2023-10-27

## 2024-05-09 NOTE — H&P PST ADULT - NEGATIVE MUSCULOSKELETAL SYMPTOMS
Population Health Chart Review & Patient Outreach Details      Additional Pop Health Notes:      DUE FOR COLONOSCOPY NEED SCHEDULE PAT & DXA.  PAT SCHEDULED - PT WILL CONSULT WITH PCP AT NEXT VISIT TO ADVISE OF DEXA SCAN PER LAST ENCOUNTER.         Updates Requested / Reviewed:      Updated Care Coordination Note and Care Everywhere         Health Maintenance Topics Overdue:      VBHM Score: 1     Osteoporosis Screening                       Health Maintenance Topic(s) Outreach Outcomes & Actions Taken:    Colorectal Cancer Screening - Outreach Outcomes & Actions Taken  : PAT SCHEDULED   no back pain/no arthritis/no joint swelling/no arthralgia/no stiffness/no neck pain

## 2024-07-29 ENCOUNTER — APPOINTMENT (OUTPATIENT)
Dept: GASTROENTEROLOGY | Facility: CLINIC | Age: 69
End: 2024-07-29
Payer: MEDICARE

## 2024-07-29 VITALS
BODY MASS INDEX: 20.89 KG/M2 | DIASTOLIC BLOOD PRESSURE: 77 MMHG | RESPIRATION RATE: 14 BRPM | HEART RATE: 65 BPM | HEIGHT: 75 IN | TEMPERATURE: 98 F | WEIGHT: 168 LBS | OXYGEN SATURATION: 96 % | SYSTOLIC BLOOD PRESSURE: 145 MMHG

## 2024-07-29 DIAGNOSIS — Z12.11 ENCOUNTER FOR SCREENING FOR MALIGNANT NEOPLASM OF COLON: ICD-10-CM

## 2024-07-29 DIAGNOSIS — K22.70 BARRETT'S ESOPHAGUS W/OUT DYSPLASIA: ICD-10-CM

## 2024-07-29 PROCEDURE — 99213 OFFICE O/P EST LOW 20 MIN: CPT

## 2024-07-30 RX ORDER — OMEPRAZOLE 20 MG/1
20 CAPSULE, DELAYED RELEASE ORAL DAILY
Qty: 90 | Refills: 3 | Status: ACTIVE | COMMUNITY
Start: 2024-07-29 | End: 2025-07-24

## 2024-08-13 ENCOUNTER — APPOINTMENT (OUTPATIENT)
Dept: INTERNAL MEDICINE | Facility: CLINIC | Age: 69
End: 2024-08-13
Payer: MEDICARE

## 2024-08-13 VITALS
HEIGHT: 75 IN | RESPIRATION RATE: 16 BRPM | BODY MASS INDEX: 20.64 KG/M2 | DIASTOLIC BLOOD PRESSURE: 76 MMHG | SYSTOLIC BLOOD PRESSURE: 130 MMHG | TEMPERATURE: 98.2 F | WEIGHT: 166 LBS | OXYGEN SATURATION: 95 % | HEART RATE: 74 BPM

## 2024-08-13 DIAGNOSIS — Z23 ENCOUNTER FOR IMMUNIZATION: ICD-10-CM

## 2024-08-13 DIAGNOSIS — Z00.00 ENCOUNTER FOR GENERAL ADULT MEDICAL EXAMINATION W/OUT ABNORMAL FINDINGS: ICD-10-CM

## 2024-08-13 DIAGNOSIS — Z90.5 ACQUIRED ABSENCE OF KIDNEY: ICD-10-CM

## 2024-08-13 DIAGNOSIS — K22.70 BARRETT'S ESOPHAGUS W/OUT DYSPLASIA: ICD-10-CM

## 2024-08-13 DIAGNOSIS — N52.9 MALE ERECTILE DYSFUNCTION, UNSPECIFIED: ICD-10-CM

## 2024-08-13 DIAGNOSIS — N20.1 CALCULUS OF URETER: ICD-10-CM

## 2024-08-13 PROCEDURE — 99204 OFFICE O/P NEW MOD 45 MIN: CPT

## 2024-08-14 ENCOUNTER — MED ADMIN CHARGE (OUTPATIENT)
Age: 69
End: 2024-08-14

## 2024-08-14 PROBLEM — Z90.5 HISTORY OF NEPHRECTOMY, RIGHT: Status: ACTIVE | Noted: 2024-08-14

## 2024-08-14 PROBLEM — N52.9 ERECTILE DYSFUNCTION, UNSPECIFIED ERECTILE DYSFUNCTION TYPE: Status: ACTIVE | Noted: 2024-08-14

## 2024-08-14 NOTE — HEALTH RISK ASSESSMENT
[Excellent] : ~his/her~  mood as  excellent [Yes] : Yes [2 - 3 times a week (3 pts)] : 2 - 3  times a week (3 points) [1 or 2 (0 pts)] : 1 or 2 (0 points) [Never (0 pts)] : Never (0 points) [No] : In the past 12 months have you used drugs other than those required for medical reasons? No [No falls in past year] : Patient reported no falls in the past year [0] : 2) Feeling down, depressed, or hopeless: Not at all (0) [PHQ-2 Negative - No further assessment needed] : PHQ-2 Negative - No further assessment needed [Never] : Never [NO] : No [Patient declined mammogram] : Patient declined mammogram [Patient declined PAP Smear] : Patient declined PAP Smear [Patient declined bone density test] : Patient declined bone density test [HIV Test offered] : HIV Test offered [Hepatitis C test offered] : Hepatitis C test offered [None] : None [Retired] : retired [College] : College [Sexually Active] : sexually active [Feels Safe at Home] : Feels safe at home [Fully functional (bathing, dressing, toileting, transferring, walking, feeding)] : Fully functional (bathing, dressing, toileting, transferring, walking, feeding) [Fully functional (using the telephone, shopping, preparing meals, housekeeping, doing laundry, using] : Fully functional and needs no help or supervision to perform IADLs (using the telephone, shopping, preparing meals, housekeeping, doing laundry, using transportation, managing medications and managing finances) [Reports normal functional visual acuity (ie: able to read med bottle)] : Reports normal functional visual acuity [Smoke Detector] : smoke detector [Carbon Monoxide Detector] : carbon monoxide detector [Safety elements used in home] : safety elements used in home [Seat Belt] :  uses seat belt [Sunscreen] : uses sunscreen [Patient reported colonoscopy was normal] : Patient reported colonoscopy was normal [With Family] : lives with family [Employed] : employed [] :  [# Of Children ___] : has [unfilled] children [2 - 4 times a month (2 pts)] : 2-4 times a month (2 points) [3 or 4 (1 pt)] : 3 or 4  (1 point) [Former] : Former [15-19] : 15-19 [> 15 Years] : > 15 Years [Audit-CScore] : 3 [de-identified] : Physically demanding job [de-identified] : Pretty healthy [facility]] : 9 [ZAU7Desql] : 0 [de-identified] : 15py quit age 40 [Change in mental status noted] : No change in mental status noted [Language] : denies difficulty with language [Behavior] : denies difficulty with behavior [Learning/Retaining New Information] : denies difficulty learning/retaining new information [Handling Complex Tasks] : denies difficulty handling complex tasks [Reasoning] : denies difficulty with reasoning [Spatial Ability and Orientation] : denies difficulty with spatial ability and orientation [High Risk Behavior] : no high risk behavior [Reports changes in hearing] : Reports no changes in hearing [Reports changes in vision] : Reports no changes in vision [Reports changes in dental health] : Reports no changes in dental health [Travel to Developing Areas] : does not  travel to developing areas [TB Exposure] : is not being exposed to tuberculosis [Caregiver Concerns] : does not have caregiver concerns [ColonoscopyDate] : 06/18 [ColonoscopyComments] : (Patient self-report) upcoming [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

## 2024-08-14 NOTE — PHYSICAL EXAM
[de-identified] : GENERAL: Pleasant tall thin white male who looks his stated age, awake alert and oriented x3, in no acute distress. HEENT: Normocephalic atraumatic. Pupils equal round reactive light and accommodation, extra ocular muscles are intact. Oropharynx is clear moist without injection or exudate. Tongue and palate move normally. Turbinates appear normal. Tympanic membranes appear normal. NECK: Supple nontender. No carotid bruits. Brisk carotid upstrokes, no JVD. No thyromegaly. LYMPH: No supraclavicular cervical axillary or inguinal lymphadenopathy. LUNGS: Clear to auscultation bilaterally. Good air entry. No inspiratory crackles or expiratory wheezes. HEART: Regular rate and rhythm without pathologic murmur rub gallop S3 or S4. BREASTS: Deferred per patient request. ABDOMEN: Soft, nontender. Normal bowel sounds. No guarding or masses. UROGENITAL: Deferred EXTREMITIES: Warm and well perfused without clubbing cyanosis or edema. 2+ pulses in all 4 extremities. Capillary refill less than 2 seconds. No foot ulcers. NEURO: Cranial nerves 2-12 grossly intact. Normal muscle bulk and tone. No resting tremor, cogwheeling, normal rapid alternating movements in the hands and feet. Symmetric DTRs in knees and ankles. No stocking paresthesia. Normal gait and station. MUSCULOSKELETAL: Mild osteoarthritic changes. No active synovitis. SKIN: No worrisome lesions, skin a little dry. PSYCH: No psychomotor agitation or retardation. Good eye contact. Unrestricted affect range. Mood congruent with affect. Linear thought.

## 2024-08-14 NOTE — PHYSICAL EXAM
[de-identified] : GENERAL: Pleasant tall thin white male who looks his stated age, awake alert and oriented x3, in no acute distress. HEENT: Normocephalic atraumatic. Pupils equal round reactive light and accommodation, extra ocular muscles are intact. Oropharynx is clear moist without injection or exudate. Tongue and palate move normally. Turbinates appear normal. Tympanic membranes appear normal. NECK: Supple nontender. No carotid bruits. Brisk carotid upstrokes, no JVD. No thyromegaly. LYMPH: No supraclavicular cervical axillary or inguinal lymphadenopathy. LUNGS: Clear to auscultation bilaterally. Good air entry. No inspiratory crackles or expiratory wheezes. HEART: Regular rate and rhythm without pathologic murmur rub gallop S3 or S4. BREASTS: Deferred per patient request. ABDOMEN: Soft, nontender. Normal bowel sounds. No guarding or masses. UROGENITAL: Deferred EXTREMITIES: Warm and well perfused without clubbing cyanosis or edema. 2+ pulses in all 4 extremities. Capillary refill less than 2 seconds. No foot ulcers. NEURO: Cranial nerves 2-12 grossly intact. Normal muscle bulk and tone. No resting tremor, cogwheeling, normal rapid alternating movements in the hands and feet. Symmetric DTRs in knees and ankles. No stocking paresthesia. Normal gait and station. MUSCULOSKELETAL: Mild osteoarthritic changes. No active synovitis. SKIN: No worrisome lesions, skin a little dry. PSYCH: No psychomotor agitation or retardation. Good eye contact. Unrestricted affect range. Mood congruent with affect. Linear thought.

## 2024-08-14 NOTE — HEALTH RISK ASSESSMENT
[Excellent] : ~his/her~  mood as  excellent [Yes] : Yes [2 - 3 times a week (3 pts)] : 2 - 3  times a week (3 points) [1 or 2 (0 pts)] : 1 or 2 (0 points) [Never (0 pts)] : Never (0 points) [No] : In the past 12 months have you used drugs other than those required for medical reasons? No [No falls in past year] : Patient reported no falls in the past year [0] : 2) Feeling down, depressed, or hopeless: Not at all (0) [PHQ-2 Negative - No further assessment needed] : PHQ-2 Negative - No further assessment needed [Never] : Never [NO] : No [Patient declined mammogram] : Patient declined mammogram [Patient declined PAP Smear] : Patient declined PAP Smear [Patient declined bone density test] : Patient declined bone density test [HIV Test offered] : HIV Test offered [Hepatitis C test offered] : Hepatitis C test offered [None] : None [Retired] : retired [College] : College [Sexually Active] : sexually active [Feels Safe at Home] : Feels safe at home [Fully functional (bathing, dressing, toileting, transferring, walking, feeding)] : Fully functional (bathing, dressing, toileting, transferring, walking, feeding) [Fully functional (using the telephone, shopping, preparing meals, housekeeping, doing laundry, using] : Fully functional and needs no help or supervision to perform IADLs (using the telephone, shopping, preparing meals, housekeeping, doing laundry, using transportation, managing medications and managing finances) [Reports normal functional visual acuity (ie: able to read med bottle)] : Reports normal functional visual acuity [Smoke Detector] : smoke detector [Carbon Monoxide Detector] : carbon monoxide detector [Safety elements used in home] : safety elements used in home [Seat Belt] :  uses seat belt [Sunscreen] : uses sunscreen [Patient reported colonoscopy was normal] : Patient reported colonoscopy was normal [With Family] : lives with family [Employed] : employed [] :  [# Of Children ___] : has [unfilled] children [2 - 4 times a month (2 pts)] : 2-4 times a month (2 points) [3 or 4 (1 pt)] : 3 or 4  (1 point) [Former] : Former [15-19] : 15-19 [> 15 Years] : > 15 Years [Audit-CScore] : 3 [de-identified] : Physically demanding job [de-identified] : Pretty healthy [[facility] : 9 [[id number]] : 0 [de-identified] : 15py quit age 40 [Change in mental status noted] : No change in mental status noted [Language] : denies difficulty with language [Behavior] : denies difficulty with behavior [Learning/Retaining New Information] : denies difficulty learning/retaining new information [Handling Complex Tasks] : denies difficulty handling complex tasks [Reasoning] : denies difficulty with reasoning [Spatial Ability and Orientation] : denies difficulty with spatial ability and orientation [High Risk Behavior] : no high risk behavior [Reports changes in hearing] : Reports no changes in hearing [Reports changes in vision] : Reports no changes in vision [Reports changes in dental health] : Reports no changes in dental health [Travel to Developing Areas] : does not  travel to developing areas [TB Exposure] : is not being exposed to tuberculosis [Caregiver Concerns] : does not have caregiver concerns [ColonoscopyDate] : 06/18 [ColonoscopyComments] : (Patient self-report) upcoming [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

## 2024-08-14 NOTE — HISTORY OF PRESENT ILLNESS
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  Upcoming EGD colonoscopy with Dr. Choi.  He has had perhaps 3 colonoscopies prior to the upcoming 1, and at least that many EGDs.  Last endoscopic evaluation approximately 2018.  Family history of multifocal metastatic carcinoid, mother.  Father  in his late 80s from respiratory illness.  He has no acute concerns.

## 2024-08-14 NOTE — ASSESSMENT
[FreeTextEntry1] : *Ayala's esophagus.  Presented solid food dysphagia, status post balloon and apparent radioablation 2010.  Subsequent EGD 2014, 2019.  EGD increased eosinophils but no intestinal metaplasia, and no dysplasia.  Has had a couple of follow-up EGDs most recently 2018, Mild solid dysphagia at present without melena or weight loss.  Upcoming EGD.  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  Removed for infection risk.  *Left calcium oxalate nephrolithiasis x 2.  First attack age 50, hospitalized for second attack age 63 (2018) underwent retrograde ureteroscopy for extraction.  Attempting to hydrate well.  *Erectile dysfunction.  Patient endorses desire, morning erections, occasional masturbation.  Has fathered 2 sons.  Struggling with soft erections.  Screen for diabetes thyroid disease, testosterone deficiency unlikely.  Will check, likely prescribe Cialis 2.5 mg.  Routine adult health maintenance. Vaccinations: Tdap provided today as patient does not routinely see providers last 2018.  Prevnar 20 eligible.  Encouraged to get Shingrix at his pharmacy. Colon cancer screening: No family history colorectal cancer though history of carcinoid (mother).  Colonoscopy 2015 (per Dr Clemons 2020 note). Prostate cancer screening: Update PSA Lung cancer screening: 15-pack-year quit almost 30 years ago does not invite low-dose lung CT. Screening for dyslipidemia thyroid liver kidney disease elevated LP(a) vitamin D deficiency and diabetes organized.

## 2024-08-14 NOTE — HISTORY OF PRESENT ILLNESS
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  Upcoming EGD colonoscopy with Dr. Choi.  He has had perhaps 3 colonoscopies prior to the upcoming 1, and at least that many EGDs.  Last endoscopic evaluation approximately 2018.  Family history of multifocal metastatic carcinoid, mother.  Father  in his late 80s from respiratory illness.  He has no acute concerns.

## 2024-08-29 ENCOUNTER — NON-APPOINTMENT (OUTPATIENT)
Age: 69
End: 2024-08-29

## 2024-09-06 ENCOUNTER — APPOINTMENT (OUTPATIENT)
Dept: GASTROENTEROLOGY | Facility: HOSPITAL | Age: 69
End: 2024-09-06

## 2024-09-06 ENCOUNTER — OUTPATIENT (OUTPATIENT)
Dept: OUTPATIENT SERVICES | Facility: HOSPITAL | Age: 69
LOS: 1 days | End: 2024-09-06
Payer: MEDICARE

## 2024-09-06 ENCOUNTER — RESULT REVIEW (OUTPATIENT)
Age: 69
End: 2024-09-06

## 2024-09-06 DIAGNOSIS — Z90.5 ACQUIRED ABSENCE OF KIDNEY: Chronic | ICD-10-CM

## 2024-09-06 DIAGNOSIS — Z98.890 OTHER SPECIFIED POSTPROCEDURAL STATES: Chronic | ICD-10-CM

## 2024-09-06 DIAGNOSIS — K22.70 BARRETT'S ESOPHAGUS WITHOUT DYSPLASIA: ICD-10-CM

## 2024-09-06 DIAGNOSIS — Z12.11 ENCOUNTER FOR SCREENING FOR MALIGNANT NEOPLASM OF COLON: ICD-10-CM

## 2024-09-06 PROCEDURE — 43239 EGD BIOPSY SINGLE/MULTIPLE: CPT

## 2024-09-06 PROCEDURE — 45378 DIAGNOSTIC COLONOSCOPY: CPT

## 2024-09-06 PROCEDURE — 88305 TISSUE EXAM BY PATHOLOGIST: CPT | Mod: 26

## 2024-09-06 PROCEDURE — 88312 SPECIAL STAINS GROUP 1: CPT | Mod: 26

## 2024-09-06 PROCEDURE — 88305 TISSUE EXAM BY PATHOLOGIST: CPT

## 2024-09-06 PROCEDURE — G0105: CPT

## 2024-09-06 PROCEDURE — 88312 SPECIAL STAINS GROUP 1: CPT

## 2024-09-06 RX ORDER — SODIUM CHLORIDE 9 MG/ML
500 INJECTION INTRAMUSCULAR; INTRAVENOUS; SUBCUTANEOUS
Refills: 0 | Status: COMPLETED | OUTPATIENT
Start: 2024-09-06 | End: 2024-09-06

## 2024-09-06 RX ADMIN — SODIUM CHLORIDE 75 MILLILITER(S): 9 INJECTION INTRAMUSCULAR; INTRAVENOUS; SUBCUTANEOUS at 10:00

## 2024-09-10 LAB — SURGICAL PATHOLOGY STUDY: SIGNIFICANT CHANGE UP

## 2024-09-17 NOTE — H&P PST ADULT - ENMT
Size Of Lesion In Cm (Optional): 0 Introduction Text (Please End With A Colon): : Detail Level: Detailed Procedure To Be Performed At Next Visit: Injection details… detailed exam mouth

## 2024-10-11 NOTE — PLAN
Bed in lowest position, wheels locked, appropriate side rails in place/Call bell, personal items and telephone in reach/Instruct patient to call for assistance before getting out of bed or chair/Non-slip footwear when patient is out of bed/[place] to call system/Physically safe environment - no spills, clutter or unnecessary equipment/Purposeful Proactive Rounding/Room/bathroom lighting operational, light cord in reach
[FreeTextEntry1] : [facility]. for [facility].\par Influenza vaccine.\par check cmp, cbc, lipid profile, hgba1c, vit.d, psa.\par

## 2024-11-08 ENCOUNTER — LABORATORY RESULT (OUTPATIENT)
Age: 69
End: 2024-11-08

## 2024-11-11 DIAGNOSIS — E78.2 MIXED HYPERLIPIDEMIA: ICD-10-CM

## 2024-11-11 RX ORDER — ROSUVASTATIN CALCIUM 20 MG/1
20 TABLET, FILM COATED ORAL
Qty: 90 | Refills: 1 | Status: ACTIVE | COMMUNITY
Start: 2024-11-11 | End: 1900-01-01

## 2024-11-12 DIAGNOSIS — R79.89 OTHER SPECIFIED ABNORMAL FINDINGS OF BLOOD CHEMISTRY: ICD-10-CM

## 2025-02-25 ENCOUNTER — APPOINTMENT (OUTPATIENT)
Dept: GASTROENTEROLOGY | Facility: CLINIC | Age: 70
End: 2025-02-25

## 2025-02-25 VITALS
TEMPERATURE: 98.2 F | HEIGHT: 75 IN | DIASTOLIC BLOOD PRESSURE: 69 MMHG | HEART RATE: 71 BPM | RESPIRATION RATE: 16 BRPM | WEIGHT: 175 LBS | SYSTOLIC BLOOD PRESSURE: 135 MMHG | BODY MASS INDEX: 21.76 KG/M2 | OXYGEN SATURATION: 98 %

## 2025-02-25 DIAGNOSIS — K20.0 EOSINOPHILIC ESOPHAGITIS: ICD-10-CM

## 2025-02-25 PROCEDURE — 99214 OFFICE O/P EST MOD 30 MIN: CPT

## 2025-02-25 PROCEDURE — G2211 COMPLEX E/M VISIT ADD ON: CPT

## 2025-02-25 RX ORDER — BUDESONIDE 0.25 MG/2ML
0.25 INHALANT ORAL
Qty: 240 | Refills: 2 | Status: ACTIVE | COMMUNITY
Start: 2025-02-25 | End: 2025-05-26

## 2025-03-30 PROBLEM — Z12.11 SCREEN FOR COLON CANCER: Status: RESOLVED | Noted: 2023-10-17 | Resolved: 2025-03-30

## 2025-03-30 PROBLEM — K57.30 DIVERTICULOSIS OF COLON: Status: ACTIVE | Noted: 2025-03-30

## 2025-04-28 ENCOUNTER — APPOINTMENT (OUTPATIENT)
Dept: GASTROENTEROLOGY | Facility: CLINIC | Age: 70
End: 2025-04-28
Payer: MEDICARE

## 2025-04-28 VITALS
HEIGHT: 75 IN | TEMPERATURE: 98.5 F | DIASTOLIC BLOOD PRESSURE: 77 MMHG | BODY MASS INDEX: 21.76 KG/M2 | RESPIRATION RATE: 16 BRPM | HEART RATE: 78 BPM | SYSTOLIC BLOOD PRESSURE: 133 MMHG | WEIGHT: 175 LBS | OXYGEN SATURATION: 98 %

## 2025-04-28 DIAGNOSIS — Z87.19 PERSONAL HISTORY OF OTHER DISEASES OF THE DIGESTIVE SYSTEM: ICD-10-CM

## 2025-04-28 DIAGNOSIS — K21.00 GASTRO-ESOPHAGEAL REFLUX DISEASE WITH ESOPHAGITIS, WITHOUT BLEEDING: ICD-10-CM

## 2025-04-28 DIAGNOSIS — K20.0 EOSINOPHILIC ESOPHAGITIS: ICD-10-CM

## 2025-04-28 PROCEDURE — G2211 COMPLEX E/M VISIT ADD ON: CPT

## 2025-04-28 PROCEDURE — 99214 OFFICE O/P EST MOD 30 MIN: CPT

## 2025-05-30 ENCOUNTER — RX RENEWAL (OUTPATIENT)
Age: 70
End: 2025-05-30

## (undated) DEVICE — CONTAINER FORMALIN BUFF 10% 60ML

## (undated) DEVICE — KIT ENDO PROCEDURE CUST W/VLV

## (undated) DEVICE — ENDOCUFF VISION SZ 2 LG GRN

## (undated) DEVICE — Device

## (undated) DEVICE — FORCEP RADIAL JAW 4 240CM DISP

## (undated) DEVICE — PLATE NESSY 170

## (undated) DEVICE — FORCEP RADIAL JAW 4 W NDL 2.4MM 2.8MM 240CM ORANGE DISP

## (undated) DEVICE — SNARE EXACTO COLD 9MMX230CM

## (undated) DEVICE — SOL IRR POUR H2O 1000ML

## (undated) DEVICE — POLY TRAP ETRAP

## (undated) DEVICE — TUBING CAP SET ERBEFLO CLEVERCAP HYBRID CO2 FOR OLYMPUS SCOPES AND UCR

## (undated) DEVICE — SNARE POLYP SENS SM 13MM 240CM